# Patient Record
Sex: MALE | Race: WHITE | NOT HISPANIC OR LATINO
[De-identification: names, ages, dates, MRNs, and addresses within clinical notes are randomized per-mention and may not be internally consistent; named-entity substitution may affect disease eponyms.]

---

## 2017-01-17 ENCOUNTER — APPOINTMENT (OUTPATIENT)
Dept: ELECTROPHYSIOLOGY | Facility: CLINIC | Age: 52
End: 2017-01-17

## 2017-02-22 ENCOUNTER — APPOINTMENT (OUTPATIENT)
Dept: ELECTROPHYSIOLOGY | Facility: CLINIC | Age: 52
End: 2017-02-22

## 2017-02-22 ENCOUNTER — NON-APPOINTMENT (OUTPATIENT)
Age: 52
End: 2017-02-22

## 2017-02-22 VITALS
DIASTOLIC BLOOD PRESSURE: 82 MMHG | WEIGHT: 265 LBS | OXYGEN SATURATION: 93 % | SYSTOLIC BLOOD PRESSURE: 118 MMHG | HEIGHT: 75 IN | BODY MASS INDEX: 32.95 KG/M2 | HEART RATE: 59 BPM

## 2017-02-27 ENCOUNTER — MEDICATION RENEWAL (OUTPATIENT)
Age: 52
End: 2017-02-27

## 2017-03-27 ENCOUNTER — APPOINTMENT (OUTPATIENT)
Dept: ELECTROPHYSIOLOGY | Facility: CLINIC | Age: 52
End: 2017-03-27

## 2017-04-04 ENCOUNTER — NON-APPOINTMENT (OUTPATIENT)
Age: 52
End: 2017-04-04

## 2017-04-04 ENCOUNTER — APPOINTMENT (OUTPATIENT)
Dept: CARDIOLOGY | Facility: CLINIC | Age: 52
End: 2017-04-04

## 2017-04-04 VITALS
SYSTOLIC BLOOD PRESSURE: 110 MMHG | HEART RATE: 51 BPM | BODY MASS INDEX: 34.07 KG/M2 | WEIGHT: 274 LBS | HEIGHT: 75 IN | DIASTOLIC BLOOD PRESSURE: 75 MMHG | RESPIRATION RATE: 17 BRPM | OXYGEN SATURATION: 98 %

## 2017-05-01 ENCOUNTER — APPOINTMENT (OUTPATIENT)
Dept: ELECTROPHYSIOLOGY | Facility: CLINIC | Age: 52
End: 2017-05-01

## 2017-05-24 ENCOUNTER — NON-APPOINTMENT (OUTPATIENT)
Age: 52
End: 2017-05-24

## 2017-05-24 ENCOUNTER — RX RENEWAL (OUTPATIENT)
Age: 52
End: 2017-05-24

## 2017-05-24 ENCOUNTER — APPOINTMENT (OUTPATIENT)
Dept: ELECTROPHYSIOLOGY | Facility: CLINIC | Age: 52
End: 2017-05-24

## 2017-05-24 VITALS — SYSTOLIC BLOOD PRESSURE: 109 MMHG | DIASTOLIC BLOOD PRESSURE: 71 MMHG | HEART RATE: 46 BPM

## 2017-06-26 ENCOUNTER — APPOINTMENT (OUTPATIENT)
Dept: ELECTROPHYSIOLOGY | Facility: CLINIC | Age: 52
End: 2017-06-26

## 2017-06-27 ENCOUNTER — APPOINTMENT (OUTPATIENT)
Dept: ELECTROPHYSIOLOGY | Facility: CLINIC | Age: 52
End: 2017-06-27

## 2017-08-08 ENCOUNTER — APPOINTMENT (OUTPATIENT)
Dept: ELECTROPHYSIOLOGY | Facility: CLINIC | Age: 52
End: 2017-08-08
Payer: COMMERCIAL

## 2017-08-08 PROCEDURE — 93298 REM INTERROG DEV EVAL SCRMS: CPT

## 2017-08-25 ENCOUNTER — RX RENEWAL (OUTPATIENT)
Age: 52
End: 2017-08-25

## 2017-09-14 ENCOUNTER — APPOINTMENT (OUTPATIENT)
Dept: ELECTROPHYSIOLOGY | Facility: CLINIC | Age: 52
End: 2017-09-14
Payer: COMMERCIAL

## 2017-09-14 PROCEDURE — 93298 REM INTERROG DEV EVAL SCRMS: CPT

## 2017-10-09 ENCOUNTER — APPOINTMENT (OUTPATIENT)
Dept: CARDIOLOGY | Facility: CLINIC | Age: 52
End: 2017-10-09

## 2017-10-19 ENCOUNTER — APPOINTMENT (OUTPATIENT)
Dept: ELECTROPHYSIOLOGY | Facility: CLINIC | Age: 52
End: 2017-10-19
Payer: COMMERCIAL

## 2017-10-19 PROCEDURE — 93298 REM INTERROG DEV EVAL SCRMS: CPT

## 2017-11-27 ENCOUNTER — RX RENEWAL (OUTPATIENT)
Age: 52
End: 2017-11-27

## 2017-11-30 ENCOUNTER — APPOINTMENT (OUTPATIENT)
Dept: ELECTROPHYSIOLOGY | Facility: CLINIC | Age: 52
End: 2017-11-30
Payer: COMMERCIAL

## 2017-11-30 PROCEDURE — 93298 REM INTERROG DEV EVAL SCRMS: CPT

## 2017-12-19 ENCOUNTER — MEDICATION RENEWAL (OUTPATIENT)
Age: 52
End: 2017-12-19

## 2018-01-04 ENCOUNTER — APPOINTMENT (OUTPATIENT)
Dept: ELECTROPHYSIOLOGY | Facility: CLINIC | Age: 53
End: 2018-01-04
Payer: COMMERCIAL

## 2018-01-04 PROCEDURE — 93298 REM INTERROG DEV EVAL SCRMS: CPT

## 2018-02-22 ENCOUNTER — APPOINTMENT (OUTPATIENT)
Dept: ELECTROPHYSIOLOGY | Facility: CLINIC | Age: 53
End: 2018-02-22
Payer: COMMERCIAL

## 2018-02-22 PROCEDURE — 93298 REM INTERROG DEV EVAL SCRMS: CPT

## 2018-02-26 ENCOUNTER — RX RENEWAL (OUTPATIENT)
Age: 53
End: 2018-02-26

## 2018-03-29 ENCOUNTER — APPOINTMENT (OUTPATIENT)
Dept: ELECTROPHYSIOLOGY | Facility: CLINIC | Age: 53
End: 2018-03-29
Payer: COMMERCIAL

## 2018-03-29 PROCEDURE — 93298 REM INTERROG DEV EVAL SCRMS: CPT

## 2018-05-14 ENCOUNTER — APPOINTMENT (OUTPATIENT)
Dept: ELECTROPHYSIOLOGY | Facility: CLINIC | Age: 53
End: 2018-05-14
Payer: COMMERCIAL

## 2018-05-14 PROCEDURE — 93298 REM INTERROG DEV EVAL SCRMS: CPT

## 2018-06-07 ENCOUNTER — RX RENEWAL (OUTPATIENT)
Age: 53
End: 2018-06-07

## 2018-07-24 ENCOUNTER — APPOINTMENT (OUTPATIENT)
Dept: ELECTROPHYSIOLOGY | Facility: CLINIC | Age: 53
End: 2018-07-24

## 2018-08-22 ENCOUNTER — APPOINTMENT (OUTPATIENT)
Dept: ELECTROPHYSIOLOGY | Facility: CLINIC | Age: 53
End: 2018-08-22
Payer: COMMERCIAL

## 2018-08-22 PROCEDURE — 93298 REM INTERROG DEV EVAL SCRMS: CPT

## 2018-08-22 PROCEDURE — 93299: CPT

## 2018-09-10 ENCOUNTER — RX RENEWAL (OUTPATIENT)
Age: 53
End: 2018-09-10

## 2018-09-17 ENCOUNTER — APPOINTMENT (OUTPATIENT)
Dept: RADIOLOGY | Facility: HOSPITAL | Age: 53
End: 2018-09-17
Payer: COMMERCIAL

## 2018-09-17 ENCOUNTER — OUTPATIENT (OUTPATIENT)
Dept: OUTPATIENT SERVICES | Facility: HOSPITAL | Age: 53
LOS: 1 days | End: 2018-09-17
Payer: COMMERCIAL

## 2018-09-17 DIAGNOSIS — Z00.8 ENCOUNTER FOR OTHER GENERAL EXAMINATION: ICD-10-CM

## 2018-09-17 PROCEDURE — 72100 X-RAY EXAM L-S SPINE 2/3 VWS: CPT | Mod: 26

## 2018-09-17 PROCEDURE — 72100 X-RAY EXAM L-S SPINE 2/3 VWS: CPT

## 2018-09-24 ENCOUNTER — APPOINTMENT (OUTPATIENT)
Dept: ELECTROPHYSIOLOGY | Facility: CLINIC | Age: 53
End: 2018-09-24
Payer: COMMERCIAL

## 2018-09-25 ENCOUNTER — APPOINTMENT (OUTPATIENT)
Dept: ELECTROPHYSIOLOGY | Facility: CLINIC | Age: 53
End: 2018-09-25
Payer: COMMERCIAL

## 2018-09-25 PROCEDURE — 93299: CPT

## 2018-09-25 PROCEDURE — 93298 REM INTERROG DEV EVAL SCRMS: CPT

## 2018-10-24 ENCOUNTER — APPOINTMENT (OUTPATIENT)
Dept: ELECTROPHYSIOLOGY | Facility: CLINIC | Age: 53
End: 2018-10-24
Payer: COMMERCIAL

## 2018-10-24 PROCEDURE — XXXXX: CPT

## 2018-11-26 ENCOUNTER — RX RENEWAL (OUTPATIENT)
Age: 53
End: 2018-11-26

## 2019-03-08 ENCOUNTER — TRANSCRIPTION ENCOUNTER (OUTPATIENT)
Age: 54
End: 2019-03-08

## 2019-03-08 ENCOUNTER — RX RENEWAL (OUTPATIENT)
Age: 54
End: 2019-03-08

## 2019-04-05 ENCOUNTER — APPOINTMENT (OUTPATIENT)
Dept: ELECTROPHYSIOLOGY | Facility: HOSPITAL | Age: 54
End: 2019-04-05
Payer: COMMERCIAL

## 2019-04-05 PROCEDURE — 93299: CPT

## 2019-04-05 PROCEDURE — 93298 REM INTERROG DEV EVAL SCRMS: CPT

## 2019-05-07 ENCOUNTER — APPOINTMENT (OUTPATIENT)
Dept: ELECTROPHYSIOLOGY | Facility: HOSPITAL | Age: 54
End: 2019-05-07
Payer: COMMERCIAL

## 2019-05-07 PROCEDURE — 93299: CPT

## 2019-05-07 PROCEDURE — 93298 REM INTERROG DEV EVAL SCRMS: CPT

## 2019-06-10 ENCOUNTER — APPOINTMENT (OUTPATIENT)
Dept: ELECTROPHYSIOLOGY | Facility: CLINIC | Age: 54
End: 2019-06-10
Payer: COMMERCIAL

## 2019-06-10 PROCEDURE — 93298 REM INTERROG DEV EVAL SCRMS: CPT

## 2019-06-10 PROCEDURE — 93299: CPT

## 2019-06-11 ENCOUNTER — RX RENEWAL (OUTPATIENT)
Age: 54
End: 2019-06-11

## 2019-07-11 ENCOUNTER — APPOINTMENT (OUTPATIENT)
Dept: ELECTROPHYSIOLOGY | Facility: CLINIC | Age: 54
End: 2019-07-11
Payer: COMMERCIAL

## 2019-07-11 PROCEDURE — 93299: CPT

## 2019-07-11 PROCEDURE — 93298 REM INTERROG DEV EVAL SCRMS: CPT

## 2019-08-13 ENCOUNTER — APPOINTMENT (OUTPATIENT)
Dept: ELECTROPHYSIOLOGY | Facility: CLINIC | Age: 54
End: 2019-08-13
Payer: COMMERCIAL

## 2019-08-13 PROCEDURE — 93299: CPT

## 2019-08-13 PROCEDURE — 93298 REM INTERROG DEV EVAL SCRMS: CPT

## 2019-09-13 ENCOUNTER — APPOINTMENT (OUTPATIENT)
Dept: ELECTROPHYSIOLOGY | Facility: CLINIC | Age: 54
End: 2019-09-13
Payer: COMMERCIAL

## 2019-09-13 PROCEDURE — 93298 REM INTERROG DEV EVAL SCRMS: CPT

## 2019-09-13 PROCEDURE — 93299: CPT

## 2019-09-23 ENCOUNTER — MEDICATION RENEWAL (OUTPATIENT)
Age: 54
End: 2019-09-23

## 2019-10-15 ENCOUNTER — APPOINTMENT (OUTPATIENT)
Dept: ELECTROPHYSIOLOGY | Facility: CLINIC | Age: 54
End: 2019-10-15
Payer: COMMERCIAL

## 2019-10-15 PROCEDURE — 93299: CPT

## 2019-10-15 PROCEDURE — 93298 REM INTERROG DEV EVAL SCRMS: CPT

## 2019-11-15 ENCOUNTER — APPOINTMENT (OUTPATIENT)
Dept: ELECTROPHYSIOLOGY | Facility: CLINIC | Age: 54
End: 2019-11-15
Payer: COMMERCIAL

## 2019-11-15 PROCEDURE — 93299: CPT

## 2019-11-15 PROCEDURE — 93298 REM INTERROG DEV EVAL SCRMS: CPT

## 2019-12-20 ENCOUNTER — APPOINTMENT (OUTPATIENT)
Dept: ELECTROPHYSIOLOGY | Facility: CLINIC | Age: 54
End: 2019-12-20
Payer: COMMERCIAL

## 2019-12-20 PROCEDURE — 93299: CPT

## 2019-12-20 PROCEDURE — 93298 REM INTERROG DEV EVAL SCRMS: CPT

## 2020-01-21 ENCOUNTER — APPOINTMENT (OUTPATIENT)
Dept: ELECTROPHYSIOLOGY | Facility: CLINIC | Age: 55
End: 2020-01-21
Payer: COMMERCIAL

## 2020-01-21 PROCEDURE — G2066: CPT

## 2020-01-21 PROCEDURE — 93298 REM INTERROG DEV EVAL SCRMS: CPT

## 2020-02-21 ENCOUNTER — APPOINTMENT (OUTPATIENT)
Dept: ELECTROPHYSIOLOGY | Facility: CLINIC | Age: 55
End: 2020-02-21
Payer: COMMERCIAL

## 2020-02-21 PROCEDURE — G2066: CPT

## 2020-02-21 PROCEDURE — 93298 REM INTERROG DEV EVAL SCRMS: CPT

## 2020-03-25 ENCOUNTER — APPOINTMENT (OUTPATIENT)
Dept: ELECTROPHYSIOLOGY | Facility: CLINIC | Age: 55
End: 2020-03-25
Payer: COMMERCIAL

## 2020-03-25 PROCEDURE — 93298 REM INTERROG DEV EVAL SCRMS: CPT

## 2020-03-25 PROCEDURE — G2066: CPT

## 2020-04-03 ENCOUNTER — RX RENEWAL (OUTPATIENT)
Age: 55
End: 2020-04-03

## 2020-04-27 ENCOUNTER — APPOINTMENT (OUTPATIENT)
Dept: ELECTROPHYSIOLOGY | Facility: CLINIC | Age: 55
End: 2020-04-27
Payer: COMMERCIAL

## 2020-04-27 PROCEDURE — 93298 REM INTERROG DEV EVAL SCRMS: CPT

## 2020-04-27 PROCEDURE — G2066: CPT

## 2020-05-19 ENCOUNTER — OUTPATIENT (OUTPATIENT)
Dept: OUTPATIENT SERVICES | Facility: HOSPITAL | Age: 55
LOS: 1 days | End: 2020-05-19
Payer: COMMERCIAL

## 2020-05-19 ENCOUNTER — APPOINTMENT (OUTPATIENT)
Dept: MRI IMAGING | Facility: HOSPITAL | Age: 55
End: 2020-05-19
Payer: COMMERCIAL

## 2020-05-19 DIAGNOSIS — Z00.8 ENCOUNTER FOR OTHER GENERAL EXAMINATION: ICD-10-CM

## 2020-05-19 PROCEDURE — A9579: CPT

## 2020-05-19 PROCEDURE — 70553 MRI BRAIN STEM W/O & W/DYE: CPT | Mod: 26

## 2020-05-19 PROCEDURE — 70553 MRI BRAIN STEM W/O & W/DYE: CPT

## 2020-05-28 ENCOUNTER — APPOINTMENT (OUTPATIENT)
Dept: ELECTROPHYSIOLOGY | Facility: CLINIC | Age: 55
End: 2020-05-28
Payer: COMMERCIAL

## 2020-05-28 PROCEDURE — G2066: CPT

## 2020-05-28 PROCEDURE — 93298 REM INTERROG DEV EVAL SCRMS: CPT

## 2020-06-30 ENCOUNTER — APPOINTMENT (OUTPATIENT)
Dept: ELECTROPHYSIOLOGY | Facility: CLINIC | Age: 55
End: 2020-06-30

## 2020-07-15 ENCOUNTER — FORM ENCOUNTER (OUTPATIENT)
Age: 55
End: 2020-07-15

## 2020-08-03 ENCOUNTER — APPOINTMENT (OUTPATIENT)
Dept: ELECTROPHYSIOLOGY | Facility: CLINIC | Age: 55
End: 2020-08-03
Payer: COMMERCIAL

## 2020-08-03 PROCEDURE — 93298 REM INTERROG DEV EVAL SCRMS: CPT

## 2020-08-03 PROCEDURE — G2066: CPT

## 2020-09-03 ENCOUNTER — APPOINTMENT (OUTPATIENT)
Dept: ELECTROPHYSIOLOGY | Facility: CLINIC | Age: 55
End: 2020-09-03

## 2020-09-28 ENCOUNTER — APPOINTMENT (OUTPATIENT)
Dept: CARDIOLOGY | Facility: CLINIC | Age: 55
End: 2020-09-28
Payer: COMMERCIAL

## 2020-09-28 ENCOUNTER — RX RENEWAL (OUTPATIENT)
Age: 55
End: 2020-09-28

## 2020-09-28 ENCOUNTER — NON-APPOINTMENT (OUTPATIENT)
Age: 55
End: 2020-09-28

## 2020-09-28 VITALS
HEART RATE: 66 BPM | RESPIRATION RATE: 17 BRPM | WEIGHT: 296 LBS | BODY MASS INDEX: 36.8 KG/M2 | DIASTOLIC BLOOD PRESSURE: 85 MMHG | TEMPERATURE: 98.4 F | HEIGHT: 75 IN | OXYGEN SATURATION: 96 % | SYSTOLIC BLOOD PRESSURE: 122 MMHG

## 2020-09-28 PROCEDURE — 93306 TTE W/DOPPLER COMPLETE: CPT

## 2020-09-28 PROCEDURE — 93000 ELECTROCARDIOGRAM COMPLETE: CPT

## 2020-09-28 PROCEDURE — 99215 OFFICE O/P EST HI 40 MIN: CPT

## 2020-09-30 ENCOUNTER — TRANSCRIPTION ENCOUNTER (OUTPATIENT)
Age: 55
End: 2020-09-30

## 2020-10-02 ENCOUNTER — APPOINTMENT (OUTPATIENT)
Dept: ELECTROPHYSIOLOGY | Facility: CLINIC | Age: 55
End: 2020-10-02
Payer: COMMERCIAL

## 2020-10-02 PROCEDURE — 99203 OFFICE O/P NEW LOW 30 MIN: CPT | Mod: 95

## 2020-10-02 NOTE — DISCUSSION/SUMMARY
[FreeTextEntry1] : s/p ILR for suspected stroke/TIA.  No events through > 3 years of monitoring.  We discussed the option of explant versus abandoning the device.  All of his questions were answered and the procedure will be arranged.

## 2020-10-02 NOTE — HISTORY OF PRESENT ILLNESS
[FreeTextEntry1] : 30  minute Telehealth visit in the setting of COVID 19:\par \par In 2016 he underwent ILR implantation to screen for occult AF as potential etiology for cryptogenic stroke.  He clarifies that at that time he stood up from his chair at work and syncopized without warning.  There was some concern for "stroke" but eventually this diagnosis was refuted.  His ILR is approaching end of life and he presents today to discuss explant.  \par \par Overall he has been feeling well with no cardiac complaints.  He denies chest pain. No shortness of breath.  No lightheadedness/dizziness.  No lightheadedness/dizziness. He has had no further syncope.

## 2020-10-12 ENCOUNTER — FORM ENCOUNTER (OUTPATIENT)
Age: 55
End: 2020-10-12

## 2021-03-11 ENCOUNTER — RX RENEWAL (OUTPATIENT)
Age: 56
End: 2021-03-11

## 2021-04-09 ENCOUNTER — NON-APPOINTMENT (OUTPATIENT)
Age: 56
End: 2021-04-09

## 2021-04-09 ENCOUNTER — APPOINTMENT (OUTPATIENT)
Dept: CARDIOLOGY | Facility: CLINIC | Age: 56
End: 2021-04-09
Payer: COMMERCIAL

## 2021-04-09 VITALS
TEMPERATURE: 98.2 F | DIASTOLIC BLOOD PRESSURE: 89 MMHG | BODY MASS INDEX: 37.05 KG/M2 | WEIGHT: 298 LBS | HEIGHT: 75 IN | HEART RATE: 70 BPM | SYSTOLIC BLOOD PRESSURE: 134 MMHG | OXYGEN SATURATION: 95 % | RESPIRATION RATE: 20 BRPM

## 2021-04-09 PROCEDURE — 99213 OFFICE O/P EST LOW 20 MIN: CPT

## 2021-04-09 PROCEDURE — 99072 ADDL SUPL MATRL&STAF TM PHE: CPT

## 2021-04-09 PROCEDURE — 93000 ELECTROCARDIOGRAM COMPLETE: CPT

## 2021-07-04 ENCOUNTER — RX RENEWAL (OUTPATIENT)
Age: 56
End: 2021-07-04

## 2021-10-08 ENCOUNTER — NON-APPOINTMENT (OUTPATIENT)
Age: 56
End: 2021-10-08

## 2021-10-08 ENCOUNTER — APPOINTMENT (OUTPATIENT)
Dept: CARDIOLOGY | Facility: CLINIC | Age: 56
End: 2021-10-08
Payer: COMMERCIAL

## 2021-10-08 VITALS
OXYGEN SATURATION: 95 % | WEIGHT: 294 LBS | TEMPERATURE: 98.3 F | HEART RATE: 57 BPM | DIASTOLIC BLOOD PRESSURE: 72 MMHG | SYSTOLIC BLOOD PRESSURE: 109 MMHG | BODY MASS INDEX: 39.82 KG/M2 | HEIGHT: 72 IN | RESPIRATION RATE: 20 BRPM

## 2021-10-08 DIAGNOSIS — Z95.818 PRESENCE OF OTHER CARDIAC IMPLANTS AND GRAFTS: ICD-10-CM

## 2021-10-08 PROCEDURE — 93000 ELECTROCARDIOGRAM COMPLETE: CPT

## 2021-10-08 PROCEDURE — 99214 OFFICE O/P EST MOD 30 MIN: CPT

## 2021-11-05 ENCOUNTER — APPOINTMENT (OUTPATIENT)
Dept: INTERNAL MEDICINE | Facility: CLINIC | Age: 56
End: 2021-11-05
Payer: COMMERCIAL

## 2021-11-05 VITALS
HEIGHT: 72 IN | TEMPERATURE: 97.3 F | BODY MASS INDEX: 39.55 KG/M2 | WEIGHT: 292 LBS | DIASTOLIC BLOOD PRESSURE: 70 MMHG | HEART RATE: 80 BPM | OXYGEN SATURATION: 97 % | SYSTOLIC BLOOD PRESSURE: 110 MMHG

## 2021-11-05 DIAGNOSIS — Z82.0 FAMILY HISTORY OF EPILEPSY AND OTHER DISEASES OF THE NERVOUS SYSTEM: ICD-10-CM

## 2021-11-05 DIAGNOSIS — J30.2 OTHER SEASONAL ALLERGIC RHINITIS: ICD-10-CM

## 2021-11-05 PROCEDURE — 99205 OFFICE O/P NEW HI 60 MIN: CPT | Mod: 25

## 2021-11-05 PROCEDURE — 90686 IIV4 VACC NO PRSV 0.5 ML IM: CPT

## 2021-11-05 PROCEDURE — 90662 IIV NO PRSV INCREASED AG IM: CPT

## 2021-11-05 PROCEDURE — G0008: CPT

## 2021-11-05 NOTE — PLAN
[FreeTextEntry1] : Continue Peloton 5x/wk.\par Calorie restriction.\par Influenza vaccine given.\par Weight loss via calorie restriction.

## 2021-12-31 ENCOUNTER — TRANSCRIPTION ENCOUNTER (OUTPATIENT)
Age: 56
End: 2021-12-31

## 2022-04-08 ENCOUNTER — APPOINTMENT (OUTPATIENT)
Dept: CARDIOLOGY | Facility: CLINIC | Age: 57
End: 2022-04-08
Payer: COMMERCIAL

## 2022-04-08 ENCOUNTER — NON-APPOINTMENT (OUTPATIENT)
Age: 57
End: 2022-04-08

## 2022-04-08 VITALS
OXYGEN SATURATION: 96 % | RESPIRATION RATE: 16 BRPM | DIASTOLIC BLOOD PRESSURE: 75 MMHG | SYSTOLIC BLOOD PRESSURE: 110 MMHG | HEIGHT: 72 IN | BODY MASS INDEX: 38.6 KG/M2 | HEART RATE: 45 BPM | TEMPERATURE: 97.6 F | WEIGHT: 285 LBS

## 2022-04-08 DIAGNOSIS — G45.9 TRANSIENT CEREBRAL ISCHEMIC ATTACK, UNSPECIFIED: ICD-10-CM

## 2022-04-08 PROCEDURE — 99215 OFFICE O/P EST HI 40 MIN: CPT

## 2022-04-08 PROCEDURE — 93000 ELECTROCARDIOGRAM COMPLETE: CPT

## 2022-04-18 ENCOUNTER — NON-APPOINTMENT (OUTPATIENT)
Age: 57
End: 2022-04-18

## 2022-04-22 ENCOUNTER — APPOINTMENT (OUTPATIENT)
Dept: CARDIOLOGY | Facility: CLINIC | Age: 57
End: 2022-04-22
Payer: COMMERCIAL

## 2022-04-22 PROCEDURE — 93015 CV STRESS TEST SUPVJ I&R: CPT

## 2022-10-03 ENCOUNTER — APPOINTMENT (OUTPATIENT)
Dept: CARDIOLOGY | Facility: CLINIC | Age: 57
End: 2022-10-03

## 2022-10-03 ENCOUNTER — NON-APPOINTMENT (OUTPATIENT)
Age: 57
End: 2022-10-03

## 2022-10-03 VITALS
SYSTOLIC BLOOD PRESSURE: 121 MMHG | TEMPERATURE: 98.2 F | HEART RATE: 62 BPM | WEIGHT: 288 LBS | BODY MASS INDEX: 39.01 KG/M2 | OXYGEN SATURATION: 93 % | HEIGHT: 72 IN | DIASTOLIC BLOOD PRESSURE: 73 MMHG | RESPIRATION RATE: 20 BRPM

## 2022-10-03 DIAGNOSIS — R06.00 DYSPNEA, UNSPECIFIED: ICD-10-CM

## 2022-10-03 DIAGNOSIS — I25.2 OLD MYOCARDIAL INFARCTION: ICD-10-CM

## 2022-10-03 DIAGNOSIS — R42 DIZZINESS AND GIDDINESS: ICD-10-CM

## 2022-10-03 PROCEDURE — 99215 OFFICE O/P EST HI 40 MIN: CPT | Mod: 25

## 2022-10-03 PROCEDURE — 93270 REMOTE 30 DAY ECG REV/REPORT: CPT

## 2022-10-03 PROCEDURE — 93000 ELECTROCARDIOGRAM COMPLETE: CPT | Mod: 59

## 2022-10-11 ENCOUNTER — APPOINTMENT (OUTPATIENT)
Dept: CARDIOLOGY | Facility: CLINIC | Age: 57
End: 2022-10-11

## 2022-10-11 ENCOUNTER — OUTPATIENT (OUTPATIENT)
Dept: OUTPATIENT SERVICES | Facility: HOSPITAL | Age: 57
LOS: 1 days | End: 2022-10-11
Payer: COMMERCIAL

## 2022-10-11 ENCOUNTER — APPOINTMENT (OUTPATIENT)
Dept: CT IMAGING | Facility: CLINIC | Age: 57
End: 2022-10-11

## 2022-10-11 DIAGNOSIS — Z00.8 ENCOUNTER FOR OTHER GENERAL EXAMINATION: ICD-10-CM

## 2022-10-11 PROCEDURE — 75574 CT ANGIO HRT W/3D IMAGE: CPT | Mod: 26

## 2022-10-11 PROCEDURE — 75574 CT ANGIO HRT W/3D IMAGE: CPT

## 2022-10-11 PROCEDURE — 93306 TTE W/DOPPLER COMPLETE: CPT

## 2022-10-16 ENCOUNTER — NON-APPOINTMENT (OUTPATIENT)
Age: 57
End: 2022-10-16

## 2022-10-17 ENCOUNTER — NON-APPOINTMENT (OUTPATIENT)
Age: 57
End: 2022-10-17

## 2022-10-17 LAB
ALBUMIN SERPL ELPH-MCNC: 4.2 G/DL
ALP BLD-CCNC: 107 U/L
ALT SERPL-CCNC: 30 U/L
ANION GAP SERPL CALC-SCNC: 12 MMOL/L
AST SERPL-CCNC: 25 U/L
BASOPHILS # BLD AUTO: 0.08 K/UL
BASOPHILS NFR BLD AUTO: 0.6 %
BILIRUB SERPL-MCNC: 0.6 MG/DL
BUN SERPL-MCNC: 16 MG/DL
CALCIUM SERPL-MCNC: 9.6 MG/DL
CHLORIDE SERPL-SCNC: 104 MMOL/L
CHOLEST SERPL-MCNC: 168 MG/DL
CO2 SERPL-SCNC: 24 MMOL/L
CREAT SERPL-MCNC: 1.12 MG/DL
EGFR: 77 ML/MIN/1.73M2
EOSINOPHIL # BLD AUTO: 0.23 K/UL
EOSINOPHIL NFR BLD AUTO: 1.8 %
ESTIMATED AVERAGE GLUCOSE: 105 MG/DL
GLUCOSE SERPL-MCNC: 102 MG/DL
HBA1C MFR BLD HPLC: 5.3 %
HCT VFR BLD CALC: 45.6 %
HDLC SERPL-MCNC: 46 MG/DL
HGB BLD-MCNC: 15.5 G/DL
IMM GRANULOCYTES NFR BLD AUTO: 0.2 %
LDLC SERPL CALC-MCNC: 107 MG/DL
LYMPHOCYTES # BLD AUTO: 2.23 K/UL
LYMPHOCYTES NFR BLD AUTO: 17.8 %
MAN DIFF?: NORMAL
MCHC RBC-ENTMCNC: 32.2 PG
MCHC RBC-ENTMCNC: 34 GM/DL
MCV RBC AUTO: 94.6 FL
MONOCYTES # BLD AUTO: 1.14 K/UL
MONOCYTES NFR BLD AUTO: 9.1 %
NEUTROPHILS # BLD AUTO: 8.85 K/UL
NEUTROPHILS NFR BLD AUTO: 70.5 %
NONHDLC SERPL-MCNC: 122 MG/DL
PLATELET # BLD AUTO: 226 K/UL
POTASSIUM SERPL-SCNC: 4.7 MMOL/L
PROT SERPL-MCNC: 6.9 G/DL
RBC # BLD: 4.82 M/UL
RBC # FLD: 12.5 %
SODIUM SERPL-SCNC: 141 MMOL/L
TRIGL SERPL-MCNC: 76 MG/DL
TSH SERPL-ACNC: 3.03 UIU/ML
WBC # FLD AUTO: 12.56 K/UL

## 2022-10-20 ENCOUNTER — APPOINTMENT (OUTPATIENT)
Dept: CT IMAGING | Facility: CLINIC | Age: 57
End: 2022-10-20

## 2022-10-22 ENCOUNTER — EMERGENCY (EMERGENCY)
Facility: HOSPITAL | Age: 57
LOS: 1 days | Discharge: HOME | End: 2022-10-22
Attending: EMERGENCY MEDICINE | Admitting: EMERGENCY MEDICINE
Payer: COMMERCIAL

## 2022-10-22 VITALS
SYSTOLIC BLOOD PRESSURE: 145 MMHG | TEMPERATURE: 98 F | RESPIRATION RATE: 18 BRPM | OXYGEN SATURATION: 96 % | DIASTOLIC BLOOD PRESSURE: 89 MMHG | HEIGHT: 75 IN | HEART RATE: 68 BPM

## 2022-10-22 LAB
ALBUMIN SERPL ELPH-MCNC: 3.1 G/DL — LOW (ref 3.3–5)
ALP SERPL-CCNC: 100 U/L — SIGNIFICANT CHANGE UP (ref 40–120)
ALT FLD-CCNC: 37 U/L — SIGNIFICANT CHANGE UP (ref 10–45)
ANION GAP SERPL CALC-SCNC: 5 MMOL/L — SIGNIFICANT CHANGE UP (ref 5–17)
APTT BLD: 31.1 SEC — SIGNIFICANT CHANGE UP (ref 27.5–35.5)
AST SERPL-CCNC: 17 U/L — SIGNIFICANT CHANGE UP (ref 10–40)
BASOPHILS # BLD AUTO: 0.09 K/UL — SIGNIFICANT CHANGE UP (ref 0–0.2)
BASOPHILS NFR BLD AUTO: 0.8 % — SIGNIFICANT CHANGE UP (ref 0–2)
BILIRUB SERPL-MCNC: 0.4 MG/DL — SIGNIFICANT CHANGE UP (ref 0.2–1.2)
BUN SERPL-MCNC: 20 MG/DL — SIGNIFICANT CHANGE UP (ref 7–23)
CALCIUM SERPL-MCNC: 9.4 MG/DL — SIGNIFICANT CHANGE UP (ref 8.4–10.5)
CHLORIDE SERPL-SCNC: 105 MMOL/L — SIGNIFICANT CHANGE UP (ref 96–108)
CO2 SERPL-SCNC: 29 MMOL/L — SIGNIFICANT CHANGE UP (ref 22–31)
CREAT SERPL-MCNC: 1.12 MG/DL — SIGNIFICANT CHANGE UP (ref 0.5–1.3)
EGFR: 77 ML/MIN/1.73M2 — SIGNIFICANT CHANGE UP
EOSINOPHIL # BLD AUTO: 0.27 K/UL — SIGNIFICANT CHANGE UP (ref 0–0.5)
EOSINOPHIL NFR BLD AUTO: 2.4 % — SIGNIFICANT CHANGE UP (ref 0–6)
GLUCOSE SERPL-MCNC: 94 MG/DL — SIGNIFICANT CHANGE UP (ref 70–99)
HCT VFR BLD CALC: 40.7 % — SIGNIFICANT CHANGE UP (ref 39–50)
HGB BLD-MCNC: 14.3 G/DL — SIGNIFICANT CHANGE UP (ref 13–17)
IMM GRANULOCYTES NFR BLD AUTO: 1 % — HIGH (ref 0–0.9)
INR BLD: 1.19 RATIO — HIGH (ref 0.88–1.16)
LYMPHOCYTES # BLD AUTO: 2.79 K/UL — SIGNIFICANT CHANGE UP (ref 1–3.3)
LYMPHOCYTES # BLD AUTO: 24.4 % — SIGNIFICANT CHANGE UP (ref 13–44)
MCHC RBC-ENTMCNC: 32.4 PG — SIGNIFICANT CHANGE UP (ref 27–34)
MCHC RBC-ENTMCNC: 35.1 GM/DL — SIGNIFICANT CHANGE UP (ref 32–36)
MCV RBC AUTO: 92.3 FL — SIGNIFICANT CHANGE UP (ref 80–100)
MONOCYTES # BLD AUTO: 1.15 K/UL — HIGH (ref 0–0.9)
MONOCYTES NFR BLD AUTO: 10.1 % — SIGNIFICANT CHANGE UP (ref 2–14)
NEUTROPHILS # BLD AUTO: 7.03 K/UL — SIGNIFICANT CHANGE UP (ref 1.8–7.4)
NEUTROPHILS NFR BLD AUTO: 61.3 % — SIGNIFICANT CHANGE UP (ref 43–77)
NRBC # BLD: 0 /100 WBCS — SIGNIFICANT CHANGE UP (ref 0–0)
PLATELET # BLD AUTO: 257 K/UL — SIGNIFICANT CHANGE UP (ref 150–400)
POTASSIUM SERPL-MCNC: 4.4 MMOL/L — SIGNIFICANT CHANGE UP (ref 3.5–5.3)
POTASSIUM SERPL-SCNC: 4.4 MMOL/L — SIGNIFICANT CHANGE UP (ref 3.5–5.3)
PROT SERPL-MCNC: 7 G/DL — SIGNIFICANT CHANGE UP (ref 6–8.3)
PROTHROM AB SERPL-ACNC: 13.8 SEC — HIGH (ref 10.5–13.4)
RBC # BLD: 4.41 M/UL — SIGNIFICANT CHANGE UP (ref 4.2–5.8)
RBC # FLD: 12.1 % — SIGNIFICANT CHANGE UP (ref 10.3–14.5)
SODIUM SERPL-SCNC: 139 MMOL/L — SIGNIFICANT CHANGE UP (ref 135–145)
WBC # BLD: 11.44 K/UL — HIGH (ref 3.8–10.5)
WBC # FLD AUTO: 11.44 K/UL — HIGH (ref 3.8–10.5)

## 2022-10-22 PROCEDURE — 36415 COLL VENOUS BLD VENIPUNCTURE: CPT

## 2022-10-22 PROCEDURE — 85025 COMPLETE CBC W/AUTO DIFF WBC: CPT

## 2022-10-22 PROCEDURE — 90715 TDAP VACCINE 7 YRS/> IM: CPT

## 2022-10-22 PROCEDURE — 90471 IMMUNIZATION ADMIN: CPT

## 2022-10-22 PROCEDURE — 99284 EMERGENCY DEPT VISIT MOD MDM: CPT

## 2022-10-22 PROCEDURE — 73706 CT ANGIO LWR EXTR W/O&W/DYE: CPT | Mod: 26,RT,MA

## 2022-10-22 PROCEDURE — 85730 THROMBOPLASTIN TIME PARTIAL: CPT

## 2022-10-22 PROCEDURE — 73590 X-RAY EXAM OF LOWER LEG: CPT

## 2022-10-22 PROCEDURE — 99284 EMERGENCY DEPT VISIT MOD MDM: CPT | Mod: 25

## 2022-10-22 PROCEDURE — 73590 X-RAY EXAM OF LOWER LEG: CPT | Mod: 26,RT

## 2022-10-22 PROCEDURE — 80053 COMPREHEN METABOLIC PANEL: CPT

## 2022-10-22 PROCEDURE — 73706 CT ANGIO LWR EXTR W/O&W/DYE: CPT | Mod: MA

## 2022-10-22 PROCEDURE — 85610 PROTHROMBIN TIME: CPT

## 2022-10-22 RX ORDER — TETANUS TOXOID, REDUCED DIPHTHERIA TOXOID AND ACELLULAR PERTUSSIS VACCINE, ADSORBED 5; 2.5; 8; 8; 2.5 [IU]/.5ML; [IU]/.5ML; UG/.5ML; UG/.5ML; UG/.5ML
0.5 SUSPENSION INTRAMUSCULAR ONCE
Refills: 0 | Status: COMPLETED | OUTPATIENT
Start: 2022-10-22 | End: 2022-10-22

## 2022-10-22 RX ADMIN — TETANUS TOXOID, REDUCED DIPHTHERIA TOXOID AND ACELLULAR PERTUSSIS VACCINE, ADSORBED 0.5 MILLILITER(S): 5; 2.5; 8; 8; 2.5 SUSPENSION INTRAMUSCULAR at 11:41

## 2022-10-22 NOTE — ED PROVIDER NOTE - NS ED ATTENDING STATEMENT MOD
This was a shared visit with the ANNA MARIE. I reviewed and verified the documentation and independently performed the documented:

## 2022-10-22 NOTE — ED PROVIDER NOTE - NSFOLLOWUPINSTRUCTIONS_ED_ALL_ED_FT
Laceration    A laceration is a cut that goes through all of the layers of the skin and into the tissue that is right under the skin. Some lacerations heal on their own. Others need to be closed with skin adhesive strips, skin glue, stitches (sutures), or staples. Proper laceration care minimizes the risk of infection and helps the laceration to heal better.  If non-absorbable stitches or staples have been placed, they must be taken out within the time frame instructed by your healthcare provider.    SEEK IMMEDIATE MEDICAL CARE IF YOU HAVE ANY OF THE FOLLOWING SYMPTOMS: swelling around the wound, worsening pain, drainage from the wound, red streaking going away from your wound, inability to move finger or toe near the laceration, or discoloration of skin near the laceration.     Follow up with your primary care provider within 48-72 hours for wound check. Keep elevated with compression. Keep sutures covered and dry for 24 hours then clean with soap and water daily.  Apply bacitracin and cover.  Return to ED for suture removal 10 days. Any increased pain, redness, streaking (red lines), swelling, fever, chills return to ER.

## 2022-10-22 NOTE — ED PROVIDER NOTE - PATIENT PORTAL LINK FT
You can access the FollowMyHealth Patient Portal offered by [facility] by registering at the following website: [email or web address]. By joining [employer]’s FollowMyHealth portal, you will also be able to view your health information using other applications (apps) compatible with our system.

## 2022-10-22 NOTE — ED ADULT NURSE NOTE - OBJECTIVE STATEMENT
Pt presents to the ED with c/o falling on his boat today hitting his right lower leg against the ladder. Laceration noted to right lower leg. Denies any other injury.

## 2022-10-22 NOTE — ED PROVIDER NOTE - NSICDXPASTMEDICALHX_GEN_ALL_CORE_FT
PAST MEDICAL HISTORY:  HLD (hyperlipidemia)     MI (myocardial infarction)     PFO (patent foramen ovale)     TIA (transient ischemic attack)

## 2022-10-22 NOTE — ED PROVIDER NOTE - OBJECTIVE STATEMENT
pt 57 yo m c/o laceration to right anterior leg. pt was on his boat and he hit his leg on the metal of the laddar. unknown last tdap. denies pain. bleeding controlled. +swelling. denies numbness, tingling, weakness

## 2022-10-22 NOTE — ED PROVIDER NOTE - CLINICAL SUMMARY MEDICAL DECISION MAKING FREE TEXT BOX
pt 55 yo m c/o laceration to right anterior leg. pt was on his boat and he hit his leg on the metal of the ladder. unknown last tdap. denies pain. bleeding controlled. +swelling. denies numbness, tingling, weakness  xray, tdap, abx, lac repair

## 2022-10-22 NOTE — ED PROVIDER NOTE - ATTENDING APP SHARED VISIT CONTRIBUTION OF CARE
Dr. Marcial: I performed a face to face bedside interview with patient regarding history of present illness, review of symptoms and past medical history. I completed an independent physical exam.  I have discussed patient's plan of care with PA.   I agree with note as stated above, having amended the EMR as needed to reflect my findings.   This includes HISTORY OF PRESENT ILLNESS, HIV, PAST MEDICAL/SURGICAL/FAMILY/SOCIAL HISTORY, ALLERGIES AND HOME MEDICATIONS, REVIEW OF SYSTEMS, PHYSICAL EXAM, and any PROGRESS NOTES during the time I functioned as the attending physician for this patient.  ADOLPH Marcial DO

## 2022-10-22 NOTE — ED ADULT NURSE NOTE - IN THE PAST 12 MONTHS HAVE YOU USED DRUGS OTHER THAN THOSE REQUIRED FOR MEDICAL REASON?
Patient to complete triage, please inform patient Covid testing order was placed
Pt informed of covid test order. Pt given number to schedule test.  Overall fine and feels congested. Denies fever or loss of sense of taste or smell. Pt reports she does have trouble taking deep breaths in. Denies shortness of breath.   Informed she krishna
Triage staff at site to call pt and triage pts' symptoms. From: Carleen Felton  To: Vickey Solano.  Paresh Gallego MD  Sent: 8/23/2021  6:30 PM CDT  Subject: Non-Urgent Medical Question    Good Evening,    I have had chest and nasal congestion for a adalgisa
No

## 2022-10-22 NOTE — ED PROVIDER NOTE - PHYSICAL EXAMINATION
Gen: Well appearing in NAD  Head: NC/AT  Neck: trachea midline  Resp:  No distress  Ext: right le: swelling, soft compartments, sensation intact, cap refill <3, 2+ pedal pulse, FROM., strenght 5/5  Neuro:  A&O appears non focal  Skin:  1cm laceration/puncture wound anterior proximal shin   Psych:  Normal affect and mood

## 2022-10-26 PROCEDURE — 93248 EXT ECG>7D<15D REV&INTERPJ: CPT

## 2022-10-31 ENCOUNTER — NON-APPOINTMENT (OUTPATIENT)
Age: 57
End: 2022-10-31

## 2022-11-01 ENCOUNTER — TRANSCRIPTION ENCOUNTER (OUTPATIENT)
Age: 57
End: 2022-11-01

## 2022-11-21 ENCOUNTER — APPOINTMENT (OUTPATIENT)
Dept: INTERNAL MEDICINE | Facility: CLINIC | Age: 57
End: 2022-11-21

## 2022-11-21 VITALS
DIASTOLIC BLOOD PRESSURE: 70 MMHG | HEIGHT: 74 IN | RESPIRATION RATE: 12 BRPM | SYSTOLIC BLOOD PRESSURE: 110 MMHG | TEMPERATURE: 98.1 F | OXYGEN SATURATION: 97 % | BODY MASS INDEX: 36.32 KG/M2 | WEIGHT: 283 LBS | HEART RATE: 57 BPM

## 2022-11-21 DIAGNOSIS — Z23 ENCOUNTER FOR IMMUNIZATION: ICD-10-CM

## 2022-11-21 PROCEDURE — G0008: CPT

## 2022-11-21 PROCEDURE — 99396 PREV VISIT EST AGE 40-64: CPT | Mod: 25

## 2022-11-21 PROCEDURE — 90686 IIV4 VACC NO PRSV 0.5 ML IM: CPT

## 2022-11-21 RX ORDER — IPRATROPIUM BROMIDE 42 UG/1
0.06 SPRAY NASAL
Qty: 15 | Refills: 0 | Status: DISCONTINUED | COMMUNITY
Start: 2022-10-17

## 2022-11-21 RX ORDER — DOXYCYCLINE 100 MG/1
100 CAPSULE ORAL
Qty: 20 | Refills: 0 | Status: DISCONTINUED | COMMUNITY
Start: 2022-10-17

## 2022-11-21 RX ORDER — SULFAMETHOXAZOLE AND TRIMETHOPRIM 800; 160 MG/1; MG/1
800-160 TABLET ORAL
Qty: 14 | Refills: 0 | Status: DISCONTINUED | COMMUNITY
Start: 2022-10-22

## 2022-11-21 NOTE — PLAN
[FreeTextEntry1] : Increase Livalo to 4mg.\par Check lipid profile and cmp in 6 months.\par Check Psa.

## 2022-11-21 NOTE — HEALTH RISK ASSESSMENT
[Very Good] : ~his/her~ current health as very good [Good] : ~his/her~  mood as  good [Intercurrent Urgi Care visits] : went to urgent care [Yes] : Yes [1 or 2 (0 pts)] : 1 or 2 (0 points) [Never (0 pts)] : Never (0 points) [No] : In the past 12 months have you used drugs other than those required for medical reasons? No [No falls in past year] : Patient reported no falls in the past year [0] : 2) Feeling down, depressed, or hopeless: Not at all (0) [HIV test declined] : HIV test declined [Hepatitis C test declined] : Hepatitis C test declined [None] : None [Employed] : employed [College] : College [] :  [Sexually Active] : sexually active [Feels Safe at Home] : Feels safe at home [Fully functional (bathing, dressing, toileting, transferring, walking, feeding)] : Fully functional (bathing, dressing, toileting, transferring, walking, feeding) [Fully functional (using the telephone, shopping, preparing meals, housekeeping, doing laundry, using] : Fully functional and needs no help or supervision to perform IADLs (using the telephone, shopping, preparing meals, housekeeping, doing laundry, using transportation, managing medications and managing finances) [Smoke Detector] : smoke detector [Carbon Monoxide Detector] : carbon monoxide detector [Seat Belt] :  uses seat belt [Sunscreen] : uses sunscreen [With Patient/Caregiver] : , with patient/caregiver [2 - 4 times a month (2 pts)] : 2-4 times a month (2 points) [PHQ-2 Negative - No further assessment needed] : PHQ-2 Negative - No further assessment needed [Name: ___] : Health Care Proxy's Name: [unfilled]  [Relationship: ___] : Relationship: [unfilled] [FreeTextEntry1] : hyperlipidemia,  [de-identified] : uri? [Audit-CScore] : 2 [de-identified] : spins 3-4 times a week 30 min  [de-identified] : chicken,beef,veggies,rice,salad  [[id number]] : 0 [Change in mental status noted] : No change in mental status noted [Language] : denies difficulty with language [Behavior] : denies difficulty with behavior [Learning/Retaining New Information] : denies difficulty learning/retaining new information [Handling Complex Tasks] : denies difficulty handling complex tasks [Reasoning] : denies difficulty with reasoning [Spatial Ability and Orientation] : denies difficulty with spatial ability and orientation [High Risk Behavior] : no high risk behavior [Reports changes in hearing] : Reports no changes in hearing [Reports changes in vision] : Reports no changes in vision [Reports normal functional visual acuity (ie: able to read med bottle)] : Reports poor functional visual acuity.  [Reports changes in dental health] : Reports no changes in dental health [Guns at Home] : no guns at home [Safety elements used in home] : no safety elements used in home [Travel to Developing Areas] : does not  travel to developing areas [TB Exposure] : is not being exposed to tuberculosis [Caregiver Concerns] : does not have caregiver concerns [ColonoscopyComments] : never had one, [FreeTextEntry2] :   [de-identified] : will schedule apt  [AdvancecareDate] : 11/21/22

## 2022-11-28 LAB
ALBUMIN SERPL ELPH-MCNC: 4.2 G/DL
ALP BLD-CCNC: 85 U/L
ALT SERPL-CCNC: 21 U/L
ANION GAP SERPL CALC-SCNC: 15 MMOL/L
AST SERPL-CCNC: 23 U/L
BILIRUB SERPL-MCNC: 0.7 MG/DL
BUN SERPL-MCNC: 12 MG/DL
CALCIUM SERPL-MCNC: 9.5 MG/DL
CHLORIDE SERPL-SCNC: 105 MMOL/L
CO2 SERPL-SCNC: 20 MMOL/L
CREAT SERPL-MCNC: 1.02 MG/DL
EGFR: 86 ML/MIN/1.73M2
GLUCOSE SERPL-MCNC: 88 MG/DL
POTASSIUM SERPL-SCNC: 4.2 MMOL/L
PROT SERPL-MCNC: 6.9 G/DL
PSA FREE FLD-MCNC: 47 %
PSA FREE SERPL-MCNC: 0.38 NG/ML
PSA SERPL-MCNC: 0.82 NG/ML
SODIUM SERPL-SCNC: 140 MMOL/L

## 2022-12-13 RX ORDER — ATENOLOL 50 MG/1
50 TABLET ORAL
Qty: 90 | Refills: 3 | Status: DISCONTINUED | COMMUNITY
Start: 2021-04-09 | End: 2022-12-13

## 2023-01-26 ENCOUNTER — OUTPATIENT (OUTPATIENT)
Dept: OUTPATIENT SERVICES | Facility: HOSPITAL | Age: 58
LOS: 1 days | End: 2023-01-26
Payer: COMMERCIAL

## 2023-01-26 ENCOUNTER — TRANSCRIPTION ENCOUNTER (OUTPATIENT)
Age: 58
End: 2023-01-26

## 2023-01-26 VITALS
OXYGEN SATURATION: 95 % | HEIGHT: 74 IN | WEIGHT: 279.99 LBS | TEMPERATURE: 98 F | RESPIRATION RATE: 18 BRPM | DIASTOLIC BLOOD PRESSURE: 84 MMHG | HEART RATE: 78 BPM | SYSTOLIC BLOOD PRESSURE: 123 MMHG

## 2023-01-26 VITALS
OXYGEN SATURATION: 95 % | DIASTOLIC BLOOD PRESSURE: 78 MMHG | RESPIRATION RATE: 18 BRPM | HEART RATE: 68 BPM | SYSTOLIC BLOOD PRESSURE: 120 MMHG

## 2023-01-26 DIAGNOSIS — Z45.010 ENCOUNTER FOR CHECKING AND TESTING OF CARDIAC PACEMAKER PULSE GENERATOR [BATTERY]: ICD-10-CM

## 2023-01-26 PROCEDURE — 33286 RMVL SUBQ CAR RHYTHM MNTR: CPT

## 2023-01-26 RX ORDER — CHLORHEXIDINE GLUCONATE 213 G/1000ML
1 SOLUTION TOPICAL ONCE
Refills: 0 | Status: COMPLETED | OUTPATIENT
Start: 2023-01-26 | End: 2023-01-26

## 2023-01-26 RX ORDER — ASPIRIN/CALCIUM CARB/MAGNESIUM 324 MG
1 TABLET ORAL
Qty: 0 | Refills: 0 | DISCHARGE

## 2023-01-26 RX ORDER — PITAVASTATIN CALCIUM 1.04 MG/1
2 TABLET, FILM COATED ORAL
Qty: 0 | Refills: 0 | DISCHARGE

## 2023-01-26 RX ORDER — PITAVASTATIN CALCIUM 1.04 MG/1
1 TABLET, FILM COATED ORAL
Qty: 0 | Refills: 0 | DISCHARGE

## 2023-01-26 RX ADMIN — CHLORHEXIDINE GLUCONATE 1 APPLICATION(S): 213 SOLUTION TOPICAL at 11:41

## 2023-01-26 NOTE — ASU PATIENT PROFILE, ADULT - FALL HARM RISK - PT AGE POPULATION HIDDEN
Anesthesia Post Evaluation    Patient: Laron Kothari Jr.    Procedure(s) Performed: Procedure(s) (LRB):  ARTHROSCOPY, KNEE, WITH MENISCECTOMY (Right)    Final Anesthesia Type: general    Patient location during evaluation: PACU  Patient participation: Yes- Able to Participate  Level of consciousness: awake and alert  Post-procedure vital signs: reviewed and stable  Pain management: adequate  Airway patency: patent  MARIYA mitigation strategies: Extubation while patient is awake  PONV status at discharge: No PONV  Anesthetic complications: no      Cardiovascular status: hemodynamically stable  Respiratory status: spontaneous ventilation  Hydration status: euvolemic  Follow-up not needed.          Vitals Value Taken Time   /82 11/24/20 0931   Temp 36.2 °C (97.2 °F) 11/24/20 0915   Pulse 88 11/24/20 0934   Resp 31 11/24/20 0934   SpO2 92 % 11/24/20 0934   Vitals shown include unvalidated device data.      No case tracking events are documented in the log.      Pain/Ely Score: Pain Rating Prior to Med Admin: 8 (11/24/2020  9:30 AM)  Ely Score: 8 (11/24/2020  9:14 AM)         Adult

## 2023-01-26 NOTE — ASU DISCHARGE PLAN (ADULT/PEDIATRIC) - ASU DC SPECIAL INSTRUCTIONSFT
No
WOUND CARE:  Do NOT scrub, rub, or pick at your incision site  the lue with wear off in 5-7 days  do not get your incision wet for 3 days   AFTER 3 days you may shower:   - use mild soap and gentle warm stream, pat dry with towel  DO NOT apply lotions, powders, ointment, or perfumes to incision  wear loose clothing around incision for 7 days  f/u appointment as instructed     ACTIVITY:   resume normal activities    Follow heart healthy diet recommended by your doctor, , if you smoke STOP SMOKING ( may call [phone] for center of tobacco control if you need assistance)     ***CALL YOUR DOCTOR***  if you experience: fever, chills, body aches, or severe pain, swelling, redness, heat or yellow discharge at incision site  If you are unable to reach your doctor, you may contact Cardiology Office at [facility] at [phone]

## 2023-01-26 NOTE — ASU DISCHARGE PLAN (ADULT/PEDIATRIC) - NS MD DC FALL RISK RISK
For information on Fall & Injury Prevention, visit: [email or web address] OR  [email or web address] OR  https://www.cdc.gov/steadi/patient.html

## 2023-01-26 NOTE — H&P CARDIOLOGY - HISTORY OF PRESENT ILLNESS
This is a 57 year old  male with known ILR implant with COVID 19 negative vaccinated with PMHX of crytogenic stroke and MI in 2009, found at the time to have PFO s/p closure, recent TIA 9/2016 (L sided weakness, aphasia) , MI and PFO. Pt now presents for ILR Explant with Dr. Goldstein today due to end of life battery. No events >3 years. Currently Asymptomatic no sob no syed no palpitations noted.  < from: Nuclear Stress Test-Exercise (Nuclear Stress Test-Exercise .) (10.28.16 @ 09:45) >  IMPRESSIONS:Normal Study  * Exercise capacity: 12 METS, Excellent for age and  gender.  * Symptom: Fatigue.  * HR Response: Appropriate.  * BP Response: Appropriate.  * Heart Rhythm: Sinus Rhythm - Rare VPD's - 72 BPM.  * Conduction defects: incomplete right bundle brach block.  * ECG Changes: ST Depression: 1 mm upsloping in leads V3,  V4, V5, V6 started at 10:30 min of exercise at HR of 166  and persisted 01:00 min into recovery.  * Arrhythmia: None.  * The left ventricle was normal in size. Normal myocardial  perfusion scan, with no evidence of infarction or  inducible ischemia.  * Post-stress gated wall motion analysis was performed  (LVEF = 54 %;LVEDV = 135 ml.)  * No previous Nuclear/Stress exam.  ------------------------------------------------------------------------  Confirmed on  10/28/2016 - 16:53:48 by Carlos Cabrera M.D.  ------------------------------------------------------------------------    < end of copied text >  < from: Transthoracic Echocardiogram (10.28.16 @ 09:30) >  Conclusions:  1. Mild aortic root dilatation  (Ao: 4.2 cm at the sinuses  of Valsalva).  2. Normal left ventricular internal dimensions and wall  thicknesses.  3. Normal left ventricular systolic function. No segmental  wall motion abnormalities.  4. Mild diastolic dysfunction (Stage I).  5. Normal right ventricular size and function.  *** No previous Echo exam.  ------------------------------------------------------------------------  Confirmed on  10/28/2016 - 11:00:06 by Helga Spicer M.D.  ------------------------------------------------------------------------    < end of copied text >  < from: Cardiac Cath Lab (03.03.09 @ 12:44) >  Ventricles: Global left ventricular function was normal.EF estimated by  contrast ventriculography was 52 %.  Coronary vessels: The coronary circulation is right dominant.  LM:      LM: Normal.  LAD:      LAD: Normal.  D1: Normal.  CX:      OM1: Normal.  OM2: Normal.  RCA:      Mid RCA: Angiography showedminor luminal irregularities with no  flow limiting lesions.  RPDA: Normal.  RPLS: Normal.  Complications: There were no complications.  Summary:  Summary: Nonobstructive CAD. Normal LV function. Cannot r/o microvascular  or endothelial dysfunction.  Recommendations:  Daily aspirin.  Aggressive lipid lowering therapy (goal LDL less than 70mg/dL).  Beta blocker (Atenolol 25 mg daily).  Prepared and signed by  Dalton Cui M.D.  Signed 03/03/2009 14:23:51  Hemodynamic tables  Pressures:  Baseline  Pressures:  - HR: 60    < end of copied text >

## 2023-01-26 NOTE — ASU DISCHARGE PLAN (ADULT/PEDIATRIC) - CARE PROVIDER_API CALL
wound check,   2/13/23 at 2:40 pm  Phone: (   )    -  Fax: (   )    -  Scheduled Appointment: 02/13/2023 02:40 PM

## 2023-02-13 ENCOUNTER — APPOINTMENT (OUTPATIENT)
Dept: ELECTROPHYSIOLOGY | Facility: CLINIC | Age: 58
End: 2023-02-13

## 2023-03-29 ENCOUNTER — APPOINTMENT (OUTPATIENT)
Dept: CARDIOLOGY | Facility: CLINIC | Age: 58
End: 2023-03-29
Payer: COMMERCIAL

## 2023-03-29 ENCOUNTER — NON-APPOINTMENT (OUTPATIENT)
Age: 58
End: 2023-03-29

## 2023-03-29 VITALS
DIASTOLIC BLOOD PRESSURE: 77 MMHG | WEIGHT: 285 LBS | BODY MASS INDEX: 36.57 KG/M2 | TEMPERATURE: 92.3 F | HEART RATE: 64 BPM | RESPIRATION RATE: 20 BRPM | OXYGEN SATURATION: 96 % | HEIGHT: 74 IN | SYSTOLIC BLOOD PRESSURE: 115 MMHG

## 2023-03-29 PROCEDURE — 99214 OFFICE O/P EST MOD 30 MIN: CPT | Mod: 25

## 2023-03-29 PROCEDURE — 93000 ELECTROCARDIOGRAM COMPLETE: CPT

## 2023-03-29 NOTE — HISTORY OF PRESENT ILLNESS
[FreeTextEntry1] : Since last visit with me, he has been feeling well.\par He has been active.  He does stationary bicycle spinning at home 4 times a week.  Denies any cardiopulmonary limitations.\par No complaints of chest pain or chest pressure.  Denies palpitations.  No dizziness or lightheadedness.  No syncope.  No edema, no orthopnea.  No PND.\par ILR was explanted without any issue.\par \par

## 2023-03-29 NOTE — CARDIOLOGY SUMMARY
[___] : [unfilled] [de-identified] : March 29, 2023. Sinus Rhythm \par -RSR(V1) -nondiagnostic. \par \par PROBABLY NORMAL [de-identified] : Oct 11, 2022. Coronary calcium score 55.  Minimal CAD.  No moderate or severe CAD idenitified.

## 2023-03-29 NOTE — REASON FOR VISIT
[FreeTextEntry1] : Cardiology follow-up visit for evaluation and management of cardiac arrhythmia, history of CVA, PFO, status post PFO closure.\par \par

## 2023-03-29 NOTE — DISCUSSION/SUMMARY
[FreeTextEntry1] : 57-year-old man with history of cardiac arrhythmia, prior CVA, PFO, status postclosure.\par Coronary artery disease based on elevated coronary artery calcium score post coronary CTA.  No severe coronary artery disease identified.\par He has an active lifestyle and has been asymptomatic with respect to cardiac issues.\par Blood pressure stable.  No JVD.  Lung fields are clear.  No edema.  He is euvolemic.\par EKG sinus rhythm, within normal limits.\par Current cardiac status appears to be stable.\par \par Plan\par 1.  Current medication list is reviewed, no changes.\par 2.  Advised to continue to try to maintain an active aerobic lifestyle.\par 3.  Follow-up with me in the office in 1 year.\par 4.  Advised to monitor for any cardiac symptoms, notify me for any changes or if any other concerns.  Cardiac issues discussed, all questions answered.\par

## 2023-04-21 LAB
CHOLEST SERPL-MCNC: 160 MG/DL
HDLC SERPL-MCNC: 47 MG/DL
LDLC SERPL CALC-MCNC: 99 MG/DL
NONHDLC SERPL-MCNC: 113 MG/DL
TRIGL SERPL-MCNC: 71 MG/DL

## 2023-06-07 ENCOUNTER — RX RENEWAL (OUTPATIENT)
Age: 58
End: 2023-06-07

## 2023-11-03 ENCOUNTER — RX RENEWAL (OUTPATIENT)
Age: 58
End: 2023-11-03

## 2023-11-03 RX ORDER — FLUTICASONE PROPIONATE 50 UG/1
50 SPRAY, METERED NASAL
Qty: 16 | Refills: 5 | Status: ACTIVE | COMMUNITY
Start: 2021-11-05 | End: 1900-01-01

## 2023-11-07 ENCOUNTER — APPOINTMENT (OUTPATIENT)
Dept: GASTROENTEROLOGY | Facility: CLINIC | Age: 58
End: 2023-11-07
Payer: COMMERCIAL

## 2023-11-07 VITALS
BODY MASS INDEX: 25.41 KG/M2 | OXYGEN SATURATION: 97 % | DIASTOLIC BLOOD PRESSURE: 80 MMHG | HEART RATE: 73 BPM | HEIGHT: 74 IN | SYSTOLIC BLOOD PRESSURE: 120 MMHG | TEMPERATURE: 97.2 F | WEIGHT: 198 LBS

## 2023-11-07 DIAGNOSIS — Z12.12 ENCOUNTER FOR SCREENING FOR MALIGNANT NEOPLASM OF COLON: ICD-10-CM

## 2023-11-07 DIAGNOSIS — Z12.11 ENCOUNTER FOR SCREENING FOR MALIGNANT NEOPLASM OF COLON: ICD-10-CM

## 2023-11-07 DIAGNOSIS — R10.13 EPIGASTRIC PAIN: ICD-10-CM

## 2023-11-07 DIAGNOSIS — E66.3 OVERWEIGHT: ICD-10-CM

## 2023-11-07 PROCEDURE — 99204 OFFICE O/P NEW MOD 45 MIN: CPT

## 2023-11-16 NOTE — ASU PATIENT PROFILE, ADULT - BLOOD TRANSFUSION, PREVIOUS, PROFILE
Hi, my name is [facility]. I'm calling in regards to my son Gearl Hodgkins. I left a message last week as well and haven't heard back. I need to set him up with the nutritionist. He has a referral from Doctor Romayne Lavender. Phone number is [phone]. Thank you. rowan Chow.    Mom left voicemail on the line - I called mom back - gave her Central Scheduling number to call to schedule a nutritionist appointment. Mom understood and was thankful for information. no

## 2023-11-30 ENCOUNTER — NON-APPOINTMENT (OUTPATIENT)
Age: 58
End: 2023-11-30

## 2023-12-01 ENCOUNTER — RX RENEWAL (OUTPATIENT)
Age: 58
End: 2023-12-01

## 2023-12-01 RX ORDER — PITAVASTATIN CALCIUM 4 MG/1
4 TABLET ORAL
Qty: 90 | Refills: 3 | Status: ACTIVE | COMMUNITY
Start: 1900-01-01 | End: 1900-01-01

## 2023-12-04 ENCOUNTER — OUTPATIENT (OUTPATIENT)
Dept: OUTPATIENT SERVICES | Facility: HOSPITAL | Age: 58
LOS: 1 days | End: 2023-12-04
Payer: COMMERCIAL

## 2023-12-04 ENCOUNTER — RESULT REVIEW (OUTPATIENT)
Age: 58
End: 2023-12-04

## 2023-12-04 ENCOUNTER — APPOINTMENT (OUTPATIENT)
Dept: GASTROENTEROLOGY | Facility: HOSPITAL | Age: 58
End: 2023-12-04

## 2023-12-04 DIAGNOSIS — Z12.12 ENCOUNTER FOR SCREENING FOR MALIGNANT NEOPLASM OF RECTUM: ICD-10-CM

## 2023-12-04 PROCEDURE — 88305 TISSUE EXAM BY PATHOLOGIST: CPT

## 2023-12-04 PROCEDURE — 88305 TISSUE EXAM BY PATHOLOGIST: CPT | Mod: 26

## 2023-12-04 PROCEDURE — 45385 COLONOSCOPY W/LESION REMOVAL: CPT | Mod: PT

## 2023-12-04 PROCEDURE — 45385 COLONOSCOPY W/LESION REMOVAL: CPT | Mod: 33

## 2023-12-04 PROCEDURE — 45380 COLONOSCOPY AND BIOPSY: CPT | Mod: 59

## 2023-12-04 PROCEDURE — 45380 COLONOSCOPY AND BIOPSY: CPT | Mod: PT,XS

## 2023-12-04 RX ORDER — SODIUM CHLORIDE 9 MG/ML
500 INJECTION INTRAMUSCULAR; INTRAVENOUS; SUBCUTANEOUS
Refills: 0 | Status: COMPLETED | OUTPATIENT
Start: 2023-12-04 | End: 2023-12-04

## 2023-12-04 RX ADMIN — SODIUM CHLORIDE 75 MILLILITER(S): 9 INJECTION INTRAMUSCULAR; INTRAVENOUS; SUBCUTANEOUS at 12:51

## 2023-12-06 LAB
SURGICAL PATHOLOGY STUDY: SIGNIFICANT CHANGE UP
SURGICAL PATHOLOGY STUDY: SIGNIFICANT CHANGE UP

## 2023-12-08 ENCOUNTER — NON-APPOINTMENT (OUTPATIENT)
Age: 58
End: 2023-12-08

## 2024-03-28 ENCOUNTER — RX RENEWAL (OUTPATIENT)
Age: 59
End: 2024-03-28

## 2024-03-29 ENCOUNTER — APPOINTMENT (OUTPATIENT)
Dept: CARDIOLOGY | Facility: CLINIC | Age: 59
End: 2024-03-29
Payer: COMMERCIAL

## 2024-03-29 ENCOUNTER — NON-APPOINTMENT (OUTPATIENT)
Age: 59
End: 2024-03-29

## 2024-03-29 VITALS
HEART RATE: 73 BPM | HEIGHT: 74 IN | WEIGHT: 288 LBS | OXYGEN SATURATION: 93 % | DIASTOLIC BLOOD PRESSURE: 75 MMHG | SYSTOLIC BLOOD PRESSURE: 109 MMHG | BODY MASS INDEX: 36.96 KG/M2 | RESPIRATION RATE: 19 BRPM

## 2024-03-29 DIAGNOSIS — I47.19 OTHER SUPRAVENTRICULAR TACHYCARDIA: ICD-10-CM

## 2024-03-29 PROCEDURE — 93000 ELECTROCARDIOGRAM COMPLETE: CPT

## 2024-03-29 PROCEDURE — 99213 OFFICE O/P EST LOW 20 MIN: CPT | Mod: 25

## 2024-03-29 NOTE — HISTORY OF PRESENT ILLNESS
[FreeTextEntry1] : Since last visit with me, he has been at his baseline. He remains active.  Uses stationary bike at home. He tries to diet appropriately but has not lost any weight. Recent increase in personal stress, he lost his sister last month ALS. No complaints of chest pain or chest pressure.  No shortness of breath.  No palpitations.  No dizziness or lightheadedness.  No syncope.  No edema, orthopnea.  No PND. Reports compliance to current medications.

## 2024-03-29 NOTE — CARDIOLOGY SUMMARY
[___] : [unfilled] [de-identified] : Oct 11, 2022. Coronary calcium score 55.  Minimal CAD.  No moderate or severe CAD idenitified.  [de-identified] : March 29 2024. Sinus Rhythm  Low voltage in limb leads. -Incomplete right bundle branch block

## 2024-03-29 NOTE — DISCUSSION/SUMMARY
[FreeTextEntry1] : 58-year-old man with cardiac history of arrhythmia, status post CVA, history of PFO, status post PFO closure. Medical issues include obesity. He has a good functional capacity has been asymptomatic with respect to cardiac issues. On physical examination today, blood pressure stable.  He is euvolemic.  No significant cardiac murmurs rubs or gallops are noted. EKG is normal sinus rhythm, incomplete right bundle branch block. Current cardiac status appears to be stable.  Plan 1.  Current medications are reviewed, no changes. 2.  He anticipates having blood work with primary care provider in the near future.  Patient plans to discuss possible GLP-1 agonist rx  3.  He will follow-up with me in the office in 1 year. 4.  Advised him to continue to maintain an active aerobic lifestyle. 5.  Advised to monitor for any cardiac symptoms and to report back for any changes or if he has any other concerns.  Cardiac issues were discussed, all questions answered.

## 2024-03-29 NOTE — REASON FOR VISIT
[FreeTextEntry1] : Cardiology follow-up visit for history of cardiac arrhythmia, history of CVA, PFO, status post PFO closure.

## 2024-04-19 LAB
24R-OH-CALCIDIOL SERPL-MCNC: 36.6 PG/ML
ALBUMIN SERPL ELPH-MCNC: 4.4 G/DL
ALP BLD-CCNC: 83 U/L
ALT SERPL-CCNC: 19 U/L
ANION GAP SERPL CALC-SCNC: 11 MMOL/L
APPEARANCE: CLEAR
AST SERPL-CCNC: 19 U/L
BASOPHILS # BLD AUTO: 0.07 K/UL
BASOPHILS NFR BLD AUTO: 0.9 %
BILIRUB SERPL-MCNC: 0.4 MG/DL
BILIRUBIN URINE: NEGATIVE
BLOOD URINE: NEGATIVE
BUN SERPL-MCNC: 16 MG/DL
CALCIUM SERPL-MCNC: 9.6 MG/DL
CHLORIDE SERPL-SCNC: 105 MMOL/L
CHOLEST SERPL-MCNC: 158 MG/DL
CO2 SERPL-SCNC: 25 MMOL/L
COLOR: YELLOW
CREAT SERPL-MCNC: 1.18 MG/DL
EGFR: 71 ML/MIN/1.73M2
EOSINOPHIL # BLD AUTO: 0.23 K/UL
EOSINOPHIL NFR BLD AUTO: 3 %
ESTIMATED AVERAGE GLUCOSE: 111 MG/DL
GLUCOSE QUALITATIVE U: NEGATIVE MG/DL
GLUCOSE SERPL-MCNC: 111 MG/DL
HBA1C MFR BLD HPLC: 5.5 %
HCT VFR BLD CALC: 45.5 %
HDLC SERPL-MCNC: 51 MG/DL
HGB BLD-MCNC: 15.9 G/DL
IMM GRANULOCYTES NFR BLD AUTO: 0.3 %
KETONES URINE: NEGATIVE MG/DL
LDLC SERPL CALC-MCNC: 92 MG/DL
LEUKOCYTE ESTERASE URINE: NEGATIVE
LYMPHOCYTES # BLD AUTO: 2.44 K/UL
LYMPHOCYTES NFR BLD AUTO: 32.3 %
MAGNESIUM SERPL-MCNC: 2.4 MG/DL
MAN DIFF?: NORMAL
MCHC RBC-ENTMCNC: 31.5 PG
MCHC RBC-ENTMCNC: 34.9 GM/DL
MCV RBC AUTO: 90.3 FL
MONOCYTES # BLD AUTO: 0.67 K/UL
MONOCYTES NFR BLD AUTO: 8.9 %
NEUTROPHILS # BLD AUTO: 4.13 K/UL
NEUTROPHILS NFR BLD AUTO: 54.6 %
NITRITE URINE: NEGATIVE
NONHDLC SERPL-MCNC: 106 MG/DL
PH URINE: 5.5
PHOSPHATE SERPL-MCNC: 2.8 MG/DL
PLATELET # BLD AUTO: 226 K/UL
POTASSIUM SERPL-SCNC: 5 MMOL/L
PROT SERPL-MCNC: 7 G/DL
PROTEIN URINE: NEGATIVE MG/DL
PSA FREE FLD-MCNC: 48 %
PSA FREE SERPL-MCNC: 0.45 NG/ML
PSA SERPL-MCNC: 0.93 NG/ML
RBC # BLD: 5.04 M/UL
RBC # FLD: 13.1 %
SODIUM SERPL-SCNC: 142 MMOL/L
SPECIFIC GRAVITY URINE: 1.02
TRIGL SERPL-MCNC: 75 MG/DL
TSH SERPL-ACNC: 2.19 UIU/ML
UROBILINOGEN URINE: 0.2 MG/DL
WBC # FLD AUTO: 7.56 K/UL

## 2024-04-25 ENCOUNTER — NON-APPOINTMENT (OUTPATIENT)
Age: 59
End: 2024-04-25

## 2024-04-26 ENCOUNTER — APPOINTMENT (OUTPATIENT)
Dept: INTERNAL MEDICINE | Facility: CLINIC | Age: 59
End: 2024-04-26
Payer: COMMERCIAL

## 2024-04-26 VITALS
WEIGHT: 290 LBS | BODY MASS INDEX: 37.22 KG/M2 | DIASTOLIC BLOOD PRESSURE: 72 MMHG | HEIGHT: 74 IN | TEMPERATURE: 97 F | HEART RATE: 65 BPM | SYSTOLIC BLOOD PRESSURE: 106 MMHG | OXYGEN SATURATION: 99 % | RESPIRATION RATE: 18 BRPM

## 2024-04-26 DIAGNOSIS — E66.9 OBESITY, UNSPECIFIED: ICD-10-CM

## 2024-04-26 PROCEDURE — 99396 PREV VISIT EST AGE 40-64: CPT

## 2024-04-26 RX ORDER — TIRZEPATIDE 5 MG/.5ML
5 INJECTION, SOLUTION SUBCUTANEOUS
Qty: 1 | Refills: 5 | Status: ACTIVE | COMMUNITY
Start: 2024-04-26 | End: 1900-01-01

## 2024-04-26 RX ORDER — SODIUM SULFATE, MAGNESIUM SULFATE, AND POTASSIUM CHLORIDE 17.75; 2.7; 2.25 G/1; G/1; G/1
1479-225-188 TABLET ORAL
Qty: 1 | Refills: 0 | Status: DISCONTINUED | COMMUNITY
Start: 2023-11-07 | End: 2024-04-26

## 2024-04-26 NOTE — COUNSELING
[Potential consequences of obesity discussed] : Potential consequences of obesity discussed [Benefits of weight loss discussed] : Benefits of weight loss discussed [Structured Weight Management Program suggested:] : Structured weight management program suggested [FreeTextEntry1] : Start Tirzepatide

## 2024-04-26 NOTE — HEALTH RISK ASSESSMENT
[Excellent] : ~his/her~  mood as  excellent [Yes] : Yes [Monthly or less (1 pt)] : Monthly or less (1 point) [1 or 2 (0 pts)] : 1 or 2 (0 points) [Never (0 pts)] : Never (0 points) [No] : In the past 12 months have you used drugs other than those required for medical reasons? No [No falls in past year] : Patient reported no falls in the past year [0] : 2) Feeling down, depressed, or hopeless: Not at all (0) [Patient reported colonoscopy was normal] : Patient reported colonoscopy was normal [None] : None [With Family] : lives with family [Employed] : employed [] :  [# Of Children ___] : has [unfilled] children [Feels Safe at Home] : Feels safe at home [Fully functional (bathing, dressing, toileting, transferring, walking, feeding)] : Fully functional (bathing, dressing, toileting, transferring, walking, feeding) [Fully functional (using the telephone, shopping, preparing meals, housekeeping, doing laundry, using] : Fully functional and needs no help or supervision to perform IADLs (using the telephone, shopping, preparing meals, housekeeping, doing laundry, using transportation, managing medications and managing finances) [Reports normal functional visual acuity (ie: able to read med bottle)] : Reports normal functional visual acuity [Smoke Detector] : smoke detector [Seat Belt] :  uses seat belt [Never] : Never [PHQ-2 Negative - No further assessment needed] : PHQ-2 Negative - No further assessment needed [FreeTextEntry1] : obesity;  [de-identified] : biking [de-identified] : regular diet  [DEX8Gyizs] : 0 [Change in mental status noted] : No change in mental status noted [Language] : denies difficulty with language [Behavior] : denies difficulty with behavior [Sexually Active] : not sexually active [High Risk Behavior] : no high risk behavior [Reports changes in hearing] : Reports no changes in hearing [Reports changes in vision] : Reports no changes in vision [Reports changes in dental health] : Reports no changes in dental health [Travel to Developing Areas] : does not  travel to developing areas [ColonoscopyDate] : 2023 [ColonoscopyComments] : Repeat 5 years.

## 2024-06-20 ENCOUNTER — EMERGENCY (EMERGENCY)
Facility: HOSPITAL | Age: 59
LOS: 1 days | Discharge: HOME | End: 2024-06-20
Attending: EMERGENCY MEDICINE
Payer: COMMERCIAL

## 2024-06-20 VITALS
HEART RATE: 60 BPM | OXYGEN SATURATION: 95 % | DIASTOLIC BLOOD PRESSURE: 81 MMHG | SYSTOLIC BLOOD PRESSURE: 120 MMHG | TEMPERATURE: 98 F | RESPIRATION RATE: 20 BRPM

## 2024-06-20 VITALS
OXYGEN SATURATION: 95 % | DIASTOLIC BLOOD PRESSURE: 83 MMHG | TEMPERATURE: 98 F | SYSTOLIC BLOOD PRESSURE: 111 MMHG | WEIGHT: 285.06 LBS | RESPIRATION RATE: 18 BRPM | HEART RATE: 79 BPM

## 2024-06-20 LAB
ALBUMIN SERPL ELPH-MCNC: 4.4 G/DL — SIGNIFICANT CHANGE UP (ref 3.3–5)
ALP SERPL-CCNC: 96 U/L — SIGNIFICANT CHANGE UP (ref 40–120)
ALT FLD-CCNC: 35 U/L — SIGNIFICANT CHANGE UP (ref 10–45)
ANION GAP SERPL CALC-SCNC: 12 MMOL/L — SIGNIFICANT CHANGE UP (ref 5–17)
APPEARANCE UR: CLEAR — SIGNIFICANT CHANGE UP
APTT BLD: 30 SEC — SIGNIFICANT CHANGE UP (ref 24.5–35.6)
AST SERPL-CCNC: 30 U/L — SIGNIFICANT CHANGE UP (ref 10–40)
BACTERIA # UR AUTO: NEGATIVE /HPF — SIGNIFICANT CHANGE UP
BASE EXCESS BLDV CALC-SCNC: 0.9 MMOL/L — SIGNIFICANT CHANGE UP (ref -2–3)
BASOPHILS # BLD AUTO: 0.06 K/UL — SIGNIFICANT CHANGE UP (ref 0–0.2)
BASOPHILS NFR BLD AUTO: 0.8 % — SIGNIFICANT CHANGE UP (ref 0–2)
BILIRUB SERPL-MCNC: 0.6 MG/DL — SIGNIFICANT CHANGE UP (ref 0.2–1.2)
BILIRUB UR-MCNC: NEGATIVE — SIGNIFICANT CHANGE UP
BUN SERPL-MCNC: 13 MG/DL — SIGNIFICANT CHANGE UP (ref 7–23)
CA-I SERPL-SCNC: 1.18 MMOL/L — SIGNIFICANT CHANGE UP (ref 1.15–1.33)
CALCIUM SERPL-MCNC: 9.2 MG/DL — SIGNIFICANT CHANGE UP (ref 8.4–10.5)
CAST: 0 /LPF — SIGNIFICANT CHANGE UP (ref 0–4)
CHLORIDE BLDV-SCNC: 99 MMOL/L — SIGNIFICANT CHANGE UP (ref 96–108)
CHLORIDE SERPL-SCNC: 104 MMOL/L — SIGNIFICANT CHANGE UP (ref 96–108)
CO2 BLDV-SCNC: 29 MMOL/L — HIGH (ref 22–26)
CO2 SERPL-SCNC: 24 MMOL/L — SIGNIFICANT CHANGE UP (ref 22–31)
COLOR SPEC: YELLOW — SIGNIFICANT CHANGE UP
CREAT SERPL-MCNC: 1 MG/DL — SIGNIFICANT CHANGE UP (ref 0.5–1.3)
DIFF PNL FLD: NEGATIVE — SIGNIFICANT CHANGE UP
EGFR: 87 ML/MIN/1.73M2 — SIGNIFICANT CHANGE UP
EOSINOPHIL # BLD AUTO: 0.13 K/UL — SIGNIFICANT CHANGE UP (ref 0–0.5)
EOSINOPHIL NFR BLD AUTO: 1.8 % — SIGNIFICANT CHANGE UP (ref 0–6)
FLUAV AG NPH QL: SIGNIFICANT CHANGE UP
FLUBV AG NPH QL: SIGNIFICANT CHANGE UP
GAS PNL BLDV: 131 MMOL/L — LOW (ref 136–145)
GAS PNL BLDV: SIGNIFICANT CHANGE UP
GAS PNL BLDV: SIGNIFICANT CHANGE UP
GLUCOSE BLDV-MCNC: 90 MG/DL — SIGNIFICANT CHANGE UP (ref 70–99)
GLUCOSE SERPL-MCNC: 118 MG/DL — HIGH (ref 70–99)
GLUCOSE UR QL: NEGATIVE MG/DL — SIGNIFICANT CHANGE UP
HCO3 BLDV-SCNC: 27 MMOL/L — SIGNIFICANT CHANGE UP (ref 22–29)
HCT VFR BLD CALC: 46.4 % — SIGNIFICANT CHANGE UP (ref 39–50)
HCT VFR BLDA CALC: 47 % — SIGNIFICANT CHANGE UP (ref 39–51)
HGB BLD CALC-MCNC: 15.5 G/DL — SIGNIFICANT CHANGE UP (ref 12.6–17.4)
HGB BLD-MCNC: 16 G/DL — SIGNIFICANT CHANGE UP (ref 13–17)
IMM GRANULOCYTES NFR BLD AUTO: 0.3 % — SIGNIFICANT CHANGE UP (ref 0–0.9)
INR BLD: 1.15 RATIO — SIGNIFICANT CHANGE UP (ref 0.85–1.18)
KETONES UR-MCNC: NEGATIVE MG/DL — SIGNIFICANT CHANGE UP
LACTATE BLDV-MCNC: 1.1 MMOL/L — SIGNIFICANT CHANGE UP (ref 0.5–2)
LEUKOCYTE ESTERASE UR-ACNC: NEGATIVE — SIGNIFICANT CHANGE UP
LYMPHOCYTES # BLD AUTO: 1.78 K/UL — SIGNIFICANT CHANGE UP (ref 1–3.3)
LYMPHOCYTES # BLD AUTO: 24 % — SIGNIFICANT CHANGE UP (ref 13–44)
MCHC RBC-ENTMCNC: 32.3 PG — SIGNIFICANT CHANGE UP (ref 27–34)
MCHC RBC-ENTMCNC: 34.5 GM/DL — SIGNIFICANT CHANGE UP (ref 32–36)
MCV RBC AUTO: 93.7 FL — SIGNIFICANT CHANGE UP (ref 80–100)
MONOCYTES # BLD AUTO: 1.17 K/UL — HIGH (ref 0–0.9)
MONOCYTES NFR BLD AUTO: 15.8 % — HIGH (ref 2–14)
NEUTROPHILS # BLD AUTO: 4.26 K/UL — SIGNIFICANT CHANGE UP (ref 1.8–7.4)
NEUTROPHILS NFR BLD AUTO: 57.3 % — SIGNIFICANT CHANGE UP (ref 43–77)
NITRITE UR-MCNC: NEGATIVE — SIGNIFICANT CHANGE UP
NRBC # BLD: 0 /100 WBCS — SIGNIFICANT CHANGE UP (ref 0–0)
PCO2 BLDV: 48 MMHG — SIGNIFICANT CHANGE UP (ref 42–55)
PH BLDV: 7.36 — SIGNIFICANT CHANGE UP (ref 7.32–7.43)
PH UR: 6 — SIGNIFICANT CHANGE UP (ref 5–8)
PLATELET # BLD AUTO: 178 K/UL — SIGNIFICANT CHANGE UP (ref 150–400)
PO2 BLDV: 35 MMHG — SIGNIFICANT CHANGE UP (ref 25–45)
POTASSIUM BLDV-SCNC: 3.9 MMOL/L — SIGNIFICANT CHANGE UP (ref 3.5–5.1)
POTASSIUM SERPL-MCNC: 3.8 MMOL/L — SIGNIFICANT CHANGE UP (ref 3.5–5.3)
POTASSIUM SERPL-SCNC: 3.8 MMOL/L — SIGNIFICANT CHANGE UP (ref 3.5–5.3)
PROT SERPL-MCNC: 7.7 G/DL — SIGNIFICANT CHANGE UP (ref 6–8.3)
PROT UR-MCNC: 30 MG/DL
PROTHROM AB SERPL-ACNC: 12 SEC — SIGNIFICANT CHANGE UP (ref 9.5–13)
RBC # BLD: 4.95 M/UL — SIGNIFICANT CHANGE UP (ref 4.2–5.8)
RBC # FLD: 12.8 % — SIGNIFICANT CHANGE UP (ref 10.3–14.5)
RBC CASTS # UR COMP ASSIST: 1 /HPF — SIGNIFICANT CHANGE UP (ref 0–4)
RSV RNA NPH QL NAA+NON-PROBE: SIGNIFICANT CHANGE UP
SAO2 % BLDV: 64.6 % — LOW (ref 67–88)
SARS-COV-2 RNA SPEC QL NAA+PROBE: DETECTED
SODIUM SERPL-SCNC: 140 MMOL/L — SIGNIFICANT CHANGE UP (ref 135–145)
SP GR SPEC: >1.03 — HIGH (ref 1–1.03)
SQUAMOUS # UR AUTO: 0 /HPF — SIGNIFICANT CHANGE UP (ref 0–5)
TROPONIN T, HIGH SENSITIVITY RESULT: 7 NG/L — SIGNIFICANT CHANGE UP (ref 0–51)
UROBILINOGEN FLD QL: 1 MG/DL — SIGNIFICANT CHANGE UP (ref 0.2–1)
WBC # BLD: 7.42 K/UL — SIGNIFICANT CHANGE UP (ref 3.8–10.5)
WBC # FLD AUTO: 7.42 K/UL — SIGNIFICANT CHANGE UP (ref 3.8–10.5)
WBC UR QL: 0 /HPF — SIGNIFICANT CHANGE UP (ref 0–5)

## 2024-06-20 PROCEDURE — 0042T: CPT | Mod: MC

## 2024-06-20 PROCEDURE — 99285 EMERGENCY DEPT VISIT HI MDM: CPT | Mod: 25

## 2024-06-20 PROCEDURE — 85014 HEMATOCRIT: CPT

## 2024-06-20 PROCEDURE — 70450 CT HEAD/BRAIN W/O DYE: CPT | Mod: MC

## 2024-06-20 PROCEDURE — 85610 PROTHROMBIN TIME: CPT

## 2024-06-20 PROCEDURE — 71045 X-RAY EXAM CHEST 1 VIEW: CPT

## 2024-06-20 PROCEDURE — 84132 ASSAY OF SERUM POTASSIUM: CPT

## 2024-06-20 PROCEDURE — 82947 ASSAY GLUCOSE BLOOD QUANT: CPT

## 2024-06-20 PROCEDURE — 85018 HEMOGLOBIN: CPT

## 2024-06-20 PROCEDURE — 84484 ASSAY OF TROPONIN QUANT: CPT

## 2024-06-20 PROCEDURE — 70496 CT ANGIOGRAPHY HEAD: CPT | Mod: MC

## 2024-06-20 PROCEDURE — 83605 ASSAY OF LACTIC ACID: CPT

## 2024-06-20 PROCEDURE — 85025 COMPLETE CBC W/AUTO DIFF WBC: CPT

## 2024-06-20 PROCEDURE — 82962 GLUCOSE BLOOD TEST: CPT

## 2024-06-20 PROCEDURE — 82330 ASSAY OF CALCIUM: CPT

## 2024-06-20 PROCEDURE — 87637 SARSCOV2&INF A&B&RSV AMP PRB: CPT

## 2024-06-20 PROCEDURE — 99285 EMERGENCY DEPT VISIT HI MDM: CPT

## 2024-06-20 PROCEDURE — 71045 X-RAY EXAM CHEST 1 VIEW: CPT | Mod: 26

## 2024-06-20 PROCEDURE — 70496 CT ANGIOGRAPHY HEAD: CPT | Mod: 26,MC

## 2024-06-20 PROCEDURE — 36000 PLACE NEEDLE IN VEIN: CPT | Mod: XU

## 2024-06-20 PROCEDURE — 87086 URINE CULTURE/COLONY COUNT: CPT

## 2024-06-20 PROCEDURE — 82435 ASSAY OF BLOOD CHLORIDE: CPT

## 2024-06-20 PROCEDURE — 80053 COMPREHEN METABOLIC PANEL: CPT

## 2024-06-20 PROCEDURE — 82803 BLOOD GASES ANY COMBINATION: CPT

## 2024-06-20 PROCEDURE — 84295 ASSAY OF SERUM SODIUM: CPT

## 2024-06-20 PROCEDURE — 70450 CT HEAD/BRAIN W/O DYE: CPT | Mod: 26,MC,59

## 2024-06-20 PROCEDURE — 81001 URINALYSIS AUTO W/SCOPE: CPT

## 2024-06-20 PROCEDURE — 93005 ELECTROCARDIOGRAM TRACING: CPT

## 2024-06-20 PROCEDURE — 70498 CT ANGIOGRAPHY NECK: CPT | Mod: MC

## 2024-06-20 PROCEDURE — 85730 THROMBOPLASTIN TIME PARTIAL: CPT

## 2024-06-20 PROCEDURE — 70498 CT ANGIOGRAPHY NECK: CPT | Mod: 26,MC

## 2024-06-20 RX ORDER — SODIUM CHLORIDE 9 MG/ML
1000 INJECTION INTRAMUSCULAR; INTRAVENOUS; SUBCUTANEOUS ONCE
Refills: 0 | Status: COMPLETED | OUTPATIENT
Start: 2024-06-20 | End: 2024-06-20

## 2024-06-20 RX ORDER — CLOPIDOGREL BISULFATE 75 MG/1
1 TABLET, FILM COATED ORAL
Qty: 21 | Refills: 0
Start: 2024-06-20 | End: 2024-07-10

## 2024-06-20 RX ADMIN — SODIUM CHLORIDE 1000 MILLILITER(S): 9 INJECTION INTRAMUSCULAR; INTRAVENOUS; SUBCUTANEOUS at 13:53

## 2024-06-20 NOTE — ED PROVIDER NOTE - IV ALTEPLASE EXCL ABS HIDDEN
RN left 3rd VM. RN requested return call to confirm receiving this message.  RN instructed Pt to have her pharmacy fax over her refill requests and these would be addressed as normal.   Pt asked to also leave any other questions or needs.    Pt has not been seen since 11/2019. Cancelled by Pt on 2/3/20. Second message left for Pt noting her request would be routed to Dr. Burton for advisement regarding any refills.    Routing to Dr. Burton regarding Pt's request for medication refills. Pt noted no current insurance, to cover an appt.    show

## 2024-06-20 NOTE — ED ADULT NURSE NOTE - CAS EDN INTEG ASSESS
- - - You were seen in the Emergency Department for:    You were prescribed to the pharmacy:  Please follow the instructions on the container/label and ask your pharmacist for any questions/concerns.    For pain, you may take Tylenol (acetaminophen) 975 mg every 6 hours, AND/OR Advil (ibuprofen) 600 mg every 8 hours.    Please follow up with your primary physician. If you do not have a primary physician or specialist of your needs, please call [phone] to find one convenient for you. At this number you will be able to locate a provider who accepts your insurance, as well as locate the right specialist for your needs.    You should return to the Emergency Department if you feel any new/worsening/persistent symptoms including but not limited to: chest pain, difficulty breathing, loss of consciousness, bleeding, uncontrolled pain, numbness/weakness of a body part

## 2024-06-20 NOTE — ED PROVIDER NOTE - PATIENT PORTAL LINK FT
You can access the FollowMyHealth Patient Portal offered by [facility] by registering at the following website: [email or web address]. By joining [employer]’s FollowMyHealth portal, you will also be able to view your health information using other applications (apps) compatible with our system.
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
## 2024-06-20 NOTE — ED PROVIDER NOTE - CLINICAL SUMMARY MEDICAL DECISION MAKING FREE TEXT BOX
58Y M PMH CVA, prior MI, PFO s/p repair, discharged from hospital yesterday here with generalized weakness and difficulty ambulating. x1 hour. Called as code stroke at triage, neuro team at bedside. NIHSS 0 currently. No focal deficits noted. Plan CTAs/perfusion study, labs, reassess. 58Y M PMH CVA, prior MI, PFO s/p repair, discharged from hospital yesterday here with generalized weakness and difficulty ambulating. x1 hour. Called as code stroke at triage, neuro team at bedside. NIHSS 0 currently. No focal deficits noted. Plan CTAs/perfusion study, labs, reassess.    bakari: 58 year old male with cva, prior MI, PFO, s/p repair discharged from hospital in [place] yesterday for stroke (left parietal occiptal stroke) here for generalieed weakness, difficulty walking x 1 hour also with R sided tingling. code sroke at triage. PE: att exam: patient awake alert NAD . LUNGS CTAB no wheeze no crackle. CARD RRR no m/r/g.  Abdomen soft NT ND no rebound no guarding no CVA tenderness. EXT WWP no edema no calf tenderness CV 2+DP/PT bilaterally. neuro A&Ox3 gait normal.  skin warm and dry no rash  plan: will get ct imaging, urine, infectious work up to r/o resurgence of symptoms, r/ro new stroke. neuro at bedside. will reassess

## 2024-06-20 NOTE — ED ADULT NURSE NOTE - CHIEF COMPLAINT QUOTE
59 yo M pt with Embolic stroke Monday with R sided weakness symptoms resolved and returned today after standing c/o loss of balance light headed and R sided weakness starting at 10:45.

## 2024-06-20 NOTE — ED PROVIDER NOTE - PROGRESS NOTE DETAILS
Elysia Perez MD PGY-2: Elysia Perez MD PGY-2: CT/CTAs negative for acute process. Found to be COVID positive, suspect viral illness led to recrudescence of stroke symptoms. patient feeling better at this time. has outpatient neuro f/u scheduled. return and f/u precautions were discussed and pt dc home in stable condition.

## 2024-06-20 NOTE — ED ADULT NURSE NOTE - ED CARDIAC RHYTHM
Pt states she needs a refill on estradiol vaginal tablets sent to [facility] in [place]. Thank you!   regular

## 2024-06-20 NOTE — CONSULT NOTE ADULT - ASSESSMENT
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  Patient woke up without weakness or numbness however had sudden onset of RLE >RUE numbness. Patient denies any HA, new weakness, change in speech or gait, facial asymmetry.     Of note, patient stated that he was discharged from [facility] yesterday for similar event. He presented to [facility] on 6/17/24 for R sided weakness/numbness, exam showed slight decrease to LT in R toes and distal sole of foot. W/u included CTA H/N which reportedly normal, TTE with bubble EF 55-60%, no shunt noted. W/u also included hypercoag panel (sent and pending). He was loaded with aspirin 325 yesterday and told to start 81mg however he has not taken his first dose yet.     As per wife, patient has an appt with EP tomorrow and already scheduled o/p follow up with Neurologist Dr. Marie Bruno     CTH showed old L occipital lobe infarct No acute findings.   LKW 11am.   NIHSS: 0  Baseline mRS: 0   Not a tenecteplase candidate due to low NIH    Impression: Subjective R sided numbness i/s/o recent L LUMA ischemic stroke most likely recrudescence. Low suspicion for new ischemic event     Recommendations:     Imaging/Labs  [] F/u CTA H/N to look at the cerebral vasculature     Meds  [] ASA81/[id number] for 3 weeks followed by ASA 81 alone indefinitely per CHANCE trial   [] Atorvastatin 80MG QHS, titrate to LDL<70    Other  [] Telemonitoring; Neurochecks and vital signs per unit protocol, Q4H  [] Goal for BP normotension.   [] BG goal <180, avoid hypoglycemia  [] NPO until clears dysphagia screen, otherwise swallow evaluation  [] Fall, aspiration precautions  [] Stroke education provided    Above mentioned plan was discussed with patient and available family member in detail. All the questions were answered and concerns were addressed.    Case discussed with stroke attending Dr. Shelley     Case to be seen by stroke attending during AM rounds     58y (1965) LEFT handed man with a PMHx significant for R occipital lobe stroke (2009 with slight residual left superior quadrantanopia) thought to be 2/2 paradoxical embolism s/p PFO closure and possibly i/s/o MI, CAD, vtach, recent episode of R sided weakness/numbness 2/2 small L luma stroke currently on aspirin monotherapy presented to the ED for recurrence of R sided numbness.  Patient woke up without weakness or numbness however had sudden onset of RLE >RUE numbness. Patient denies any HA, new weakness, change in speech or gait, facial asymmetry.     Of note, patient stated that he was discharged from [facility] yesterday for similar event. He presented to [facility] on 6/17/24 for R sided weakness/numbness, exam showed slight decrease to LT in R toes and distal sole of foot. W/u included CTA H/N which reportedly normal, TTE with bubble EF 55-60%, no shunt noted. W/u also included hypercoag panel (sent and pending). He was loaded with aspirin 325 yesterday and told to start 81mg however he has not taken his first dose yet.     As per wife, patient has an appt with EP tomorrow and already scheduled o/p follow up with Neurologist Dr. Marie Bruno     CTH showed old L occipital lobe infarct No acute findings.   LKW 11am.   NIHSS: 0  Baseline mRS: 0   Not a tenecteplase candidate due to low NIH    Impression: Subjective R sided numbness i/s/o recent L LUMA ischemic stroke most likely recrudescence. Low suspicion for new ischemic event     Recommendations:     Imaging/Labs  [] F/u CTA H/N to look at the cerebral vasculature     Meds  [] ASA81/[id number] for 3 weeks followed by ASA 81 alone indefinitely per CHANCE trial   [] Atorvastatin 80MG QHS, titrate to LDL<70    Other  [] Telemonitoring; Neurochecks and vital signs per unit protocol, Q4H  [] Goal for BP normotension.   [] BG goal <180, avoid hypoglycemia  [] NPO until clears dysphagia screen, otherwise swallow evaluation  [] Fall, aspiration precautions  [] Stroke education provided    Above mentioned plan was discussed with patient and available family member in detail. All the questions were answered and concerns were addressed.    Patient can follow up with Dr. Danis Shelley at [address] [place] [address] 1-2 weeks after discharge. Please instruct the patient to call [phone] to schedule this appointment.     Case discussed with stroke attending Dr. Shelley     Case to be seen by stroke attending during AM rounds     58y (1965) LEFT handed man with a PMHx significant for R occipital lobe stroke (2009 with slight residual left superior quadrantanopia) thought to be 2/2 paradoxical embolism s/p PFO closure and possibly i/s/o MI, CAD, vtach, recent episode of R sided weakness/numbness 2/2 small L luma stroke currently on aspirin monotherapy presented to the ED for recurrence of R sided numbness.  Patient woke up without weakness or numbness however had sudden onset of RLE >RUE numbness. Patient denies any HA, new weakness, change in speech or gait, facial asymmetry.     Of note, patient stated that he was discharged from [facility] yesterday for similar event. He presented to [facility] on 6/17/24 for R sided weakness/numbness, exam showed slight decrease to LT in R toes and distal sole of foot. W/u included CTA H/N which reportedly normal, TTE with bubble EF 55-60%, no shunt noted. W/u also included hypercoag panel (sent and pending). He was loaded with aspirin 325 yesterday and told to start 81mg however he has not taken his first dose yet.     As per wife, patient has an appt with EP tomorrow and already scheduled o/p follow up with Neurologist Dr. Marie Bruno     CTH showed old L occipital lobe infarct No acute findings. CTA neg for acute pathology   LKW 11am.   NIHSS: 0  Baseline mRS: 0   Not a tenecteplase candidate due to low NIH    Impression: Subjective R sided numbness i/s/o recent L LUMA ischemic stroke most likely recrudescence. Low suspicion for new ischemic event     Recommendations:     Imaging/Labs  [x] CTA H/N to look at the cerebral vasculature     Meds  [] ASA81/[id number] for 3 weeks followed by ASA 81 alone indefinitely per CHANCE trial   [] Atorvastatin 80MG QHS, titrate to LDL<70    Other  [] Telemonitoring; Neurochecks and vital signs per unit protocol, Q4H  [] Goal for BP normotension.   [] BG goal <180, avoid hypoglycemia  [] NPO until clears dysphagia screen, otherwise swallow evaluation  [] Fall, aspiration precautions  [] Stroke education provided    Outpatient ENT follow up for 1cm right parotic enhancing lesion     Above mentioned plan was discussed with patient and available family member in detail. All the questions were answered and concerns were addressed.    Patient can follow up with Dr. Danis Shelley at [address] [place] [address] 1-2 weeks after discharge. Please instruct the patient to call [phone] to schedule this appointment.     Case discussed with stroke attending Dr. Shelley     Case to be seen by stroke attending during AM rounds     58y (1965) LEFT handed man with a PMHx significant for R occipital lobe stroke (2009 with slight residual left superior quadrantanopia) thought to be 2/2 paradoxical embolism s/p PFO closure and possibly i/s/o MI, CAD, vtach, recent episode of R sided weakness/numbness 2/2 small L luma stroke currently on aspirin monotherapy presented to the ED for recurrence of R sided numbness.  Patient woke up without weakness or numbness however had sudden onset of RLE >RUE numbness. Patient denies any HA, new weakness, change in speech or gait, facial asymmetry.     Of note, patient stated that he was discharged from [facility] yesterday for similar event. He presented to [facility] on 6/17/24 for R sided weakness/numbness, exam showed slight decrease to LT in R toes and distal sole of foot. W/u included CTA H/N which reportedly normal, TTE with bubble EF 55-60%, no shunt noted. W/u also included hypercoag panel (sent and pending). He was loaded with aspirin 325 yesterday and told to start 81mg however he has not taken his first dose yet.     As per wife, patient has an appt with EP tomorrow and already scheduled o/p follow up with Neurologist Dr. Marie Bruno     CTH showed old L occipital lobe infarct No acute findings. CTA neg for acute pathology   LKW 11am.   NIHSS: 0  Baseline mRS: 0   Not a tenecteplase candidate due to low NIH    Impression: Subjective R sided numbness i/s/o recent L LUMA ischemic stroke most likely recrudescence i/s/o COVID . Low suspicion for new ischemic event    Recommendations:     Imaging/Labs  [x] CTA H/N to look at the cerebral vasculature     Meds  [] ASA81/[id number] for 3 weeks followed by ASA 81 alone indefinitely per CHANCE trial   [] Atorvastatin 80MG QHS, titrate to LDL<70    Other  [] Telemonitoring; Neurochecks and vital signs per unit protocol, Q4H  [] Goal for BP normotension.   [] BG goal <180, avoid hypoglycemia  [] NPO until clears dysphagia screen, otherwise swallow evaluation  [] Fall, aspiration precautions  [] Stroke education provided    Outpatient ENT follow up for 1cm right parotic enhancing lesion     Above mentioned plan was discussed with patient and available family member in detail. All the questions were answered and concerns were addressed.    Patient can follow up with Dr. Danis Shelley at [address] [place] [address] 1-2 weeks after discharge. Please instruct the patient to call [phone] to schedule this appointment.     Case discussed with stroke attending Dr. Shelley     Case to be seen by stroke attending during AM rounds

## 2024-06-20 NOTE — ED ADULT TRIAGE NOTE - CHIEF COMPLAINT QUOTE
57 yo M pt with Embolic stroke Monday with R sided weakness symptoms resolved and returned today after standing c/o loss of balance light headed and R sided weakness starting at 10:45.

## 2024-06-20 NOTE — CONSULT NOTE ADULT - SUBJECTIVE AND OBJECTIVE BOX
Neurology - Consult Note    -  Spectra: [phone] ([facility]), [phone] ([facility])  -    HPI: Patient NICOLE FREDERICK is a 58y (1965) LEFT handed man with a PMHx significant for R occipital lobe stroke (2009 with slight residual left superior quadrantanopia) thought to be 2/2 paradoxical embolism s/p PFO closure and possibly i/s/o MI, CAD, vtach, recent episode of R sided weakness/numbness 2/2 small L luma stroke currently on aspirin monotherapy presented to the ED for recurrence of R sided numbness.  Patient woke up without weakness or numbness however had sudden onset of RLE >RUE numbness. Patient denies any HA, new weakness, change in speech or gait, facial asymmetry.     Of note, patient stated that he was discharged from [facility] yesterday for similar event. He presented to [facility] on 6/17/24 for R sided weakness/numbness, exam showed slight decrease to LT in R toes and distal sole of foot. W/u included CTA H/N which reportedly normal, TTE with bubble EF 55-60%, no shunt noted. W/u also included hypercoag panel (sent and pending). He was loaded with aspirin 325 yesterday and told to start 81mg however he has not taken his first dose yet.     As per wife, patient has an appt with EP tomorrow and already scheduled o/p follow up with Neurologist Dr. Marie Bruno     CTH showed old L occipital lobe infarct No acute findings.   LKW 11am.   NIHSS: 0  Baseline mRS: 0   Not a tenecteplase candidate due to low NIH      Review of Systems:     CONSTITUTIONAL: No fevers or chills  EYES AND ENT: No visual changes or no throat pain   NECK: No pain or stiffness  RESPIRATORY: No hemoptysis or shortness of breath  CARDIOVASCULAR: No chest pain or palpitations  GASTROINTESTINAL: No melena or hematochezia  GENITOURINARY: No dysuria or hematuria  NEUROLOGICAL: +As stated in HPI above  SKIN: No itching, burning, rashes, or lesions   All other review of systems is negative unless indicated above.    Allergies:  No Known Allergies      PMHx/PSHx/Family Hx: As above, otherwise see below   HLD (hyperlipidemia)    TIA (transient ischemic attack)    PFO (patent foramen ovale)    MI (myocardial infarction)        Social Hx:  No reported use of tobacco, alcohol, or illicit drugs    Medications:  MEDICATIONS  (STANDING):    MEDICATIONS  (PRN):        Home Medications:  aspirin 81 mg oral tablet: 1 tab(s) orally once a day (26 Jan 2023 11:59)  Livalo 2 mg oral tablet: 2 tab(s) orally once a day (26 Jan 2023 11:59)      Vitals:  T(C): 36.9 (06-20-24 @ 11:41), Max: 36.9 (06-20-24 @ 11:41)  HR: 79 (06-20-24 @ 11:41) (79 - 79)  BP: 111/83 (06-20-24 @ 11:41) (111/83 - 111/83)  RR: 18 (06-20-24 @ 11:41) (18 - 18)  SpO2: 95% (06-20-24 @ 11:41) (95% - 95%)    Physical Examination:    General - NAD  Cardiovascular - Peripheral pulses palpable, no edema    Neurologic Exam:  Mental status - Awake, Alert, Oriented to person, place, and time. Speech fluent, repetition and naming intact. Follows simple and complex commands. attention, concentration and fund of knowledge intact.     Cranial nerves:  CN II: Visual fields are full to confrontation. Pupils are 3 mm and briskly reactive to light.   CN III, IV, VI: EOMI, no nystagmus, no ptosis  CN V: Facial sensation is intact to pinprick in all 3 divisions bilaterally.  CN VII: Face is symmetric with normal eye closure and smile.  CN VII: Hearing is normal to rubbing fingers  CN IX, X: Palate elevates symmetrically. Phonation is normal.  CN XI: Head turning and shoulder shrug are intact  CN XII: Tongue is midline with normal movements and no atrophy.    Motor - Normal bulk and tone throughout. No pronator drift.  Strength testing            Deltoid      Biceps      Triceps     Wrist Extension    Wrist Flexion       R            5                 5               5                     5                              5                        5  L             5                 5               5                     5                              5                       5              Hip Flexion    Hip Extension    Knee Flexion    Knee Extension    Dorsiflexion    Plantar Flexion  R              5                           5                       5                           5                            5                          5  L              5                           5                        5                           5                            5                          5    neg hoffmans b/l    Sensation - Light touch/temperature intact throughout    DTR's -             Biceps      Triceps     Brachioradialis      Patellar    Ankle    Toes/plantar response  R             2+             2+                  2+                       2+            2+                 Down  L              2+             2+                 2+                        2+           2+                 Down    Coordination - Finger to Nose and heal to shin intact b/l. No tremors appreciated    Gait and station - Normal casual gait. Romberg (-)    Labs:          CAPILLARY BLOOD GLUCOSE      POCT Blood Glucose.: 102 mg/dL (20 Jun 2024 11:41)          CSF:                  Radiology:     Neurology - Consult Note    -  Spectra: [phone] ([facility]), [phone] ([facility])  -    HPI: Patient NICOLE FREDERICK is a 58y (1965) LEFT handed man with a PMHx significant for R occipital lobe stroke (2009 with slight residual left superior quadrantanopia) thought to be 2/2 paradoxical embolism s/p PFO closure and possibly i/s/o MI, CAD, vtach, recent episode of R sided weakness/numbness 2/2 small L luma stroke currently on aspirin monotherapy presented to the ED for recurrence of R sided numbness.  Patient woke up without weakness or numbness however had sudden onset of RLE >RUE numbness. Patient denies any HA, new weakness, change in speech or gait, facial asymmetry.     Of note, patient stated that he was discharged from [facility] yesterday for similar event. He presented to [facility] on 6/17/24 for R sided weakness/numbness, exam showed slight decrease to LT in R toes and distal sole of foot. W/u included CTA H/N which reportedly normal, TTE with bubble EF 55-60%, no shunt noted. W/u also included hypercoag panel (sent and pending). He was loaded with aspirin 325 yesterday and told to start 81mg however he has not taken his first dose yet.     As per wife, patient has an appt with EP tomorrow and already scheduled o/p follow up with Neurologist Dr. Marie Bruno     CTH showed old L occipital lobe infarct No acute findings.   LKW 11am.   NIHSS: 0  Baseline mRS: 0   Not a tenecteplase candidate due to low NIH      Review of Systems:     CONSTITUTIONAL: No fevers or chills  EYES AND ENT: No visual changes or no throat pain   NECK: No pain or stiffness  RESPIRATORY: No hemoptysis or shortness of breath  CARDIOVASCULAR: No chest pain or palpitations  GASTROINTESTINAL: No melena or hematochezia  GENITOURINARY: No dysuria or hematuria  NEUROLOGICAL: +As stated in HPI above  SKIN: No itching, burning, rashes, or lesions   All other review of systems is negative unless indicated above.    Allergies:  No Known Allergies      PMHx/PSHx/Family Hx: As above, otherwise see below   HLD (hyperlipidemia)    TIA (transient ischemic attack)    PFO (patent foramen ovale)    MI (myocardial infarction)        Social Hx:  No reported use of tobacco, alcohol, or illicit drugs    Medications:  MEDICATIONS  (STANDING):    MEDICATIONS  (PRN):        Home Medications:  aspirin 81 mg oral tablet: 1 tab(s) orally once a day (26 Jan 2023 11:59)  Livalo 2 mg oral tablet: 2 tab(s) orally once a day (26 Jan 2023 11:59)      Vitals:  T(C): 36.9 (06-20-24 @ 11:41), Max: 36.9 (06-20-24 @ 11:41)  HR: 79 (06-20-24 @ 11:41) (79 - 79)  BP: 111/83 (06-20-24 @ 11:41) (111/83 - 111/83)  RR: 18 (06-20-24 @ 11:41) (18 - 18)  SpO2: 95% (06-20-24 @ 11:41) (95% - 95%)    Physical Examination:    General - NAD  Cardiovascular - Peripheral pulses palpable, no edema    Neurologic Exam:  Mental status - Awake, Alert, Oriented to person, place, and time. Speech fluent, repetition and naming intact. Follows simple and complex commands. attention, concentration and fund of knowledge intact.     Cranial nerves:  CN II: Visual fields are full to confrontation. Pupils are 3 mm and briskly reactive to light.   CN III, IV, VI: EOMI, no nystagmus, no ptosis  CN V: Facial sensation is intact to pinprick in all 3 divisions bilaterally.  CN VII: Face is symmetric with normal eye closure and smile.  CN VII: Hearing is normal to rubbing fingers  CN IX, X: Palate elevates symmetrically. Phonation is normal.  CN XI: Head turning and shoulder shrug are intact  CN XII: Tongue is midline with normal movements and no atrophy.    Motor - Normal bulk and tone throughout. No pronator drift.  Strength testing            Deltoid      Biceps      Triceps     Wrist Extension    Wrist Flexion       R            5                 5               5                     5                              5                        5  L             5                 5               5                     5                              5                       5              Hip Flexion    Hip Extension    Knee Flexion    Knee Extension    Dorsiflexion    Plantar Flexion  R              5                           5                       5                           5                            5                          5  L              5                           5                        5                           5                            5                          5    neg hoffmans b/l    Sensation - Light touch/temperature intact throughout    DTR's -             Biceps      Triceps     Brachioradialis      Patellar    Ankle    Toes/plantar response  R             2+             2+                  2+                       2+            2+                 Down  L              2+             2+                 2+                        2+           2+                 Down    Coordination - Finger to Nose and heal to shin intact b/l. No tremors appreciated    Gait and station - Normal casual gait. Romberg (-)    Labs:          CAPILLARY BLOOD GLUCOSE      POCT Blood Glucose.: 102 mg/dL (20 Jun 2024 11:41)          CSF:                  Radiology:  < from: CT Angio Neck Stroke Protocol w/ IV Cont (06.20.24 @ 12:30) >  IMPRESSION:  CT angiogram neck:  -No vaso-occlusive disease.    CT angiogram head:  -No vaso-occlusive disease.    CT perfusion:  -No convincing evidence for core infarct orat-risk ischemic tissue   identified. Consider MRI if clinical concern for acute infarct persists.    Other:  -1 cm right parotid enhancing lesion. Differential includes lymph node or   salivary gland neoplasm. Recommend ENT evaluation and targeted ultrasound   for further evaluation.    < end of copied text >     Neurology - Consult Note    -  Spectra: [phone] ([facility]), [phone] ([facility])  -    HPI: Patient NICOLE FREDERICK is a 58y (1965) LEFT handed man with a PMHx significant for R occipital lobe stroke (2009 with slight residual left superior quadrantanopia) thought to be 2/2 paradoxical embolism s/p PFO closure and possibly i/s/o MI, CAD, vtach, recent episode of R sided weakness/numbness 2/2 small L luma stroke currently on aspirin monotherapy presented to the ED for recurrence of R sided numbness.  Patient woke up without weakness or numbness however had sudden onset of RLE >RUE numbness. Patient denies any HA, new weakness, change in speech or gait, facial asymmetry.     Of note, patient stated that he was discharged from [facility] yesterday for similar event. He presented to [facility] on 6/17/24 for R sided weakness/numbness, exam showed slight decrease to LT in R toes and distal sole of foot. W/u included CTA H/N which reportedly normal, TTE with bubble EF 55-60%, no shunt noted. W/u also included hypercoag panel (sent and pending). He was loaded with aspirin 325 yesterday and told to start 81mg however he has not taken his first dose yet.     As per wife, patient has an appt with EP tomorrow and already scheduled o/p follow up with Neurologist Dr. Marie Bruno     CTH showed old L occipital lobe infarct No acute findings.   LKW 11am.   NIHSS: 0  Baseline mRS: 0   Not a tenecteplase candidate due to low NIH    **ED work up positive for COVID      Review of Systems:     CONSTITUTIONAL: No fevers or chills  EYES AND ENT: No visual changes or no throat pain   NECK: No pain or stiffness  RESPIRATORY: No hemoptysis or shortness of breath  CARDIOVASCULAR: No chest pain or palpitations  GASTROINTESTINAL: No melena or hematochezia  GENITOURINARY: No dysuria or hematuria  NEUROLOGICAL: +As stated in HPI above  SKIN: No itching, burning, rashes, or lesions   All other review of systems is negative unless indicated above.    Allergies:  No Known Allergies      PMHx/PSHx/Family Hx: As above, otherwise see below   HLD (hyperlipidemia)    TIA (transient ischemic attack)    PFO (patent foramen ovale)    MI (myocardial infarction)        Social Hx:  No reported use of tobacco, alcohol, or illicit drugs    Medications:  MEDICATIONS  (STANDING):    MEDICATIONS  (PRN):        Home Medications:  aspirin 81 mg oral tablet: 1 tab(s) orally once a day (26 Jan 2023 11:59)  Livalo 2 mg oral tablet: 2 tab(s) orally once a day (26 Jan 2023 11:59)      Vitals:  T(C): 36.9 (06-20-24 @ 11:41), Max: 36.9 (06-20-24 @ 11:41)  HR: 79 (06-20-24 @ 11:41) (79 - 79)  BP: 111/83 (06-20-24 @ 11:41) (111/83 - 111/83)  RR: 18 (06-20-24 @ 11:41) (18 - 18)  SpO2: 95% (06-20-24 @ 11:41) (95% - 95%)    Physical Examination:    General - NAD  Cardiovascular - Peripheral pulses palpable, no edema    Neurologic Exam:  Mental status - Awake, Alert, Oriented to person, place, and time. Speech fluent, repetition and naming intact. Follows simple and complex commands. attention, concentration and fund of knowledge intact.     Cranial nerves:  CN II: Visual fields are full to confrontation. Pupils are 3 mm and briskly reactive to light.   CN III, IV, VI: EOMI, no nystagmus, no ptosis  CN V: Facial sensation is intact to pinprick in all 3 divisions bilaterally.  CN VII: Face is symmetric with normal eye closure and smile.  CN VII: Hearing is normal to rubbing fingers  CN IX, X: Palate elevates symmetrically. Phonation is normal.  CN XI: Head turning and shoulder shrug are intact  CN XII: Tongue is midline with normal movements and no atrophy.    Motor - Normal bulk and tone throughout. No pronator drift.  Strength testing            Deltoid      Biceps      Triceps     Wrist Extension    Wrist Flexion       R            5                 5               5                     5                              5                        5  L             5                 5               5                     5                              5                       5              Hip Flexion    Hip Extension    Knee Flexion    Knee Extension    Dorsiflexion    Plantar Flexion  R              5                           5                       5                           5                            5                          5  L              5                           5                        5                           5                            5                          5    neg hoffmans b/l    Sensation - Light touch/temperature intact throughout    DTR's -             Biceps      Triceps     Brachioradialis      Patellar    Ankle    Toes/plantar response  R             2+             2+                  2+                       2+            2+                 Down  L              2+             2+                 2+                        2+           2+                 Down    Coordination - Finger to Nose and heal to shin intact b/l. No tremors appreciated    Gait and station - Normal casual gait. Romberg (-)    Labs:          CAPILLARY BLOOD GLUCOSE      POCT Blood Glucose.: 102 mg/dL (20 Jun 2024 11:41)          CSF:                  Radiology:  < from: CT Angio Neck Stroke Protocol w/ IV Cont (06.20.24 @ 12:30) >  IMPRESSION:  CT angiogram neck:  -No vaso-occlusive disease.    CT angiogram head:  -No vaso-occlusive disease.    CT perfusion:  -No convincing evidence for core infarct orat-risk ischemic tissue   identified. Consider MRI if clinical concern for acute infarct persists.    Other:  -1 cm right parotid enhancing lesion. Differential includes lymph node or   salivary gland neoplasm. Recommend ENT evaluation and targeted ultrasound   for further evaluation.    < end of copied text >

## 2024-06-20 NOTE — ED PROVIDER NOTE - OBJECTIVE STATEMENT
58Y M PMH CVA, PMI, PFO s/p repair, discharged from [facility] yesterday after a stroke, presenting with weakness. Patient reports that approx 1 hour prior to arrival he started feeling "woozy", with R arm and leg weakness/difficulty moving. Specifically described feeling as though he is underwater. Was able to ambulate but states he had to think about it more than he typically would, didn't feel confident in his movement/walking. No associated headache, chest pain, SOB, abdominal pain, fevers.

## 2024-06-20 NOTE — ED PROVIDER NOTE - NSFOLLOWUPINSTRUCTIONS_ED_ALL_ED_FT
You were seen in the ER today for generalized weakness and numbness.    Your CT scans did not show any signs of an acute stroke today. You tested positive for COVID.    The neurology doctors recommend starting Plavix 75mg daily for the next 3 weeks.     Follow up with your primary doctor and cardiologist in the next week as scheduled.    Return to the ER if you You were seen in the ER today for generalized weakness and numbness.    Your CT scans did not show any signs of an acute stroke today. You tested positive for COVID.    The neurology doctors recommend starting Plavix 75mg daily for the next 3 weeks.     Follow up with your primary doctor and cardiologist in the next week as scheduled.    Return to the ER if you develop new or worsening symptoms including chest pain, difficulty breathing, worsening headaches or dizziness, or if you are otherwise concerned.

## 2024-06-20 NOTE — ED ADULT NURSE NOTE - OBJECTIVE STATEMENT
57 yo M presents to ED A+OX3 c/o R arm tingling. Patient states he was admitted and d/c from a hospital in [place] yesterday after being treated for a stroke. States this morning 1 hour prior to ED arrival he had R side arm and leg tingling that has since resolved. States he felt lightheaded while walking and "like I was under water." Patient denies N/V, weakness, chest pain, SOB, fever, chills. Patient states he feels congested. 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 Speech clear. Ambulatory with steady gait. Pupils equal round and reactive. Equal strength and sensation x 4 extremities. Patient placed on cardiac monitor, NSR on cardiac monitor.

## 2024-06-21 ENCOUNTER — APPOINTMENT (OUTPATIENT)
Dept: CARDIOLOGY | Facility: CLINIC | Age: 59
End: 2024-06-21
Payer: COMMERCIAL

## 2024-06-21 VITALS
SYSTOLIC BLOOD PRESSURE: 110 MMHG | WEIGHT: 290 LBS | BODY MASS INDEX: 37.22 KG/M2 | HEART RATE: 65 BPM | DIASTOLIC BLOOD PRESSURE: 70 MMHG | RESPIRATION RATE: 18 BRPM | HEIGHT: 74 IN | OXYGEN SATURATION: 99 %

## 2024-06-21 DIAGNOSIS — E78.5 HYPERLIPIDEMIA, UNSPECIFIED: ICD-10-CM

## 2024-06-21 DIAGNOSIS — I25.10 ATHEROSCLEROTIC HEART DISEASE OF NATIVE CORONARY ARTERY W/OUT ANGINA PECTORIS: ICD-10-CM

## 2024-06-21 DIAGNOSIS — I47.29 OTHER VENTRICULAR TACHYCARDIA: ICD-10-CM

## 2024-06-21 DIAGNOSIS — I77.810 THORACIC AORTIC ECTASIA: ICD-10-CM

## 2024-06-21 LAB
CULTURE RESULTS: SIGNIFICANT CHANGE UP
SPECIMEN SOURCE: SIGNIFICANT CHANGE UP

## 2024-06-21 PROCEDURE — 99215 OFFICE O/P EST HI 40 MIN: CPT | Mod: 25

## 2024-06-21 PROCEDURE — 93000 ELECTROCARDIOGRAM COMPLETE: CPT | Mod: 59

## 2024-06-21 PROCEDURE — 93242 EXT ECG>48HR<7D RECORDING: CPT

## 2024-06-21 RX ORDER — CLOPIDOGREL 75 MG/1
75 TABLET, FILM COATED ORAL
Refills: 0 | Status: ACTIVE | COMMUNITY

## 2024-06-21 RX ORDER — ASPIRIN 81 MG
81 TABLET, DELAYED RELEASE (ENTERIC COATED) ORAL
Refills: 0 | Status: ACTIVE | COMMUNITY

## 2024-06-21 RX ORDER — UBIDECARENONE 200 MG
CAPSULE ORAL
Refills: 0 | Status: ACTIVE | COMMUNITY

## 2024-06-21 NOTE — ED POST DISCHARGE NOTE - RESULT SUMMARY
spoke with pt who states he is aware of finding, saw his cardiologist today who noted it and he will follow up to monitor nodule. -Tarsha Manley PA-C

## 2024-06-23 NOTE — CARDIOLOGY SUMMARY
[___] : [unfilled] [de-identified] : June 21, 2024.  Normal sinus rhythm.  Incomplete right bundle branch block. [de-identified] : June 18, 2024.  [facility].  Transthoracic echocardiography with intravenous contrast and agitated saline injection.  Normal left ventricular systolic function.  Dilated aortic root 4.6 cm.  Mildly dilated right ventricle.  Normal RV function.  Atrial septum: Agitated saline contrast study at baseline or with provocation shows no right to left atrial  level shunt. [de-identified] : Oct 11, 2022. Coronary calcium score 55.  Minimal CAD.  No moderate or severe CAD idenitified.

## 2024-06-23 NOTE — DISCUSSION/SUMMARY
[EKG obtained to assist in diagnosis and management of assessed problem(s)] : EKG obtained to assist in diagnosis and management of assessed problem(s) [FreeTextEntry1] : 58-year-old man presenting for acute cardiology office visit status post recent hospitalization for acute neurologic symptoms, diagnosed with new CVA, in the setting of recent COVID infection and cardiac arrhyhtmia (non sustained VT)  He has complicated cardiac history of PFO, status post remote closure.  History of nonsustained VT, s/p ILR that is end of service, aortic root enlargement, mild coronary artery disease, hyperlipidemia, aortic root enlargement. On physical examination, blood pressure is low normal.  He is euvolemic.  No new cardiac murmurs rubs or gallops are noted. EKG is sinus rhythm, low voltage.  Incomplete right bundle branch block.  Largely unchanged from prior. Neurology and cardiology imaging, as noted above. Unclear etiology of new onset of CVA.  He also had nonsustained VT in this setting.  Not clear if arrhythmia is playing a role.  However, given prior history of PFO and PFO closure, further evaluation is indicated.  Plan 1.  Reasonable to pursue transesophageal echocardiogram to reassess PFO closure and to rule out peridevice leak or thrombus formation.  Transthoracic echocardiogram done earlier this week did not suggest any left-to-right shunting, but FERNANDO will have a better sensitivity and specificity.  FERNANDO is ordered and will be done in the near future. 2.  Extended Holter monitor is ordered and placed today. 3.  CTA of the aorta is ordered to establish baseline.  Aortic root enlargement noted on echocardiogram. 4.  He will follow-up with electrophysiologist... re implant iLR?  5.  Reviewed incidental finding on CTA done yesterday at [facility] of 1 cm enhancing lesion on the right parotid gland.  Differential diagnosis includes lymph node versus neoplasm.  Radiologist recommending ENT evaluation.  I discussed this with patient and wife, and they will make appointment to see ENT as well. 6.  He will be seeing neurology next week. 7.  Current medications are reviewed, no changes.  He will continue with dual antiplatelet therapy, metoprolol and atorvastatin.  Zepbound was previously ordered and has now just been approved for him.  He plans to defer starting it for now, but will consider it for the future.  8.  The above was reviewed with the patient and with his wife in detail, all questions have been answered to their satisfaction.

## 2024-06-23 NOTE — REASON FOR VISIT
[FreeTextEntry1] : Acute cardiology follow-up office visit status post new diagnosis of CVA.  He has history of nonsustained VT, hyperlipidemia, aortic root enlargement, mild CAD, history of PFO closure and prior CVA.

## 2024-06-24 ENCOUNTER — APPOINTMENT (OUTPATIENT)
Dept: NEUROLOGY | Facility: CLINIC | Age: 59
End: 2024-06-24
Payer: COMMERCIAL

## 2024-06-24 VITALS
DIASTOLIC BLOOD PRESSURE: 80 MMHG | TEMPERATURE: 97 F | OXYGEN SATURATION: 99 % | SYSTOLIC BLOOD PRESSURE: 110 MMHG | BODY MASS INDEX: 37.22 KG/M2 | HEART RATE: 68 BPM | WEIGHT: 290 LBS | HEIGHT: 74 IN

## 2024-06-24 DIAGNOSIS — Q21.12 PATENT FORAMEN OVALE: ICD-10-CM

## 2024-06-24 DIAGNOSIS — I63.9 CEREBRAL INFARCTION, UNSPECIFIED: ICD-10-CM

## 2024-06-24 PROCEDURE — 99205 OFFICE O/P NEW HI 60 MIN: CPT

## 2024-06-24 PROCEDURE — G2211 COMPLEX E/M VISIT ADD ON: CPT

## 2024-06-25 ENCOUNTER — APPOINTMENT (OUTPATIENT)
Dept: CT IMAGING | Facility: HOSPITAL | Age: 59
End: 2024-06-25
Payer: COMMERCIAL

## 2024-06-25 ENCOUNTER — NON-APPOINTMENT (OUTPATIENT)
Age: 59
End: 2024-06-25

## 2024-06-25 ENCOUNTER — OUTPATIENT (OUTPATIENT)
Dept: OUTPATIENT SERVICES | Facility: HOSPITAL | Age: 59
LOS: 1 days | End: 2024-06-25
Payer: COMMERCIAL

## 2024-06-25 DIAGNOSIS — I77.810 THORACIC AORTIC ECTASIA: ICD-10-CM

## 2024-06-25 PROCEDURE — 71275 CT ANGIOGRAPHY CHEST: CPT | Mod: 26

## 2024-06-25 PROCEDURE — 71275 CT ANGIOGRAPHY CHEST: CPT

## 2024-06-26 ENCOUNTER — OUTPATIENT (OUTPATIENT)
Dept: OUTPATIENT SERVICES | Facility: HOSPITAL | Age: 59
LOS: 1 days | End: 2024-06-26
Payer: COMMERCIAL

## 2024-06-26 ENCOUNTER — APPOINTMENT (OUTPATIENT)
Dept: ULTRASOUND IMAGING | Facility: HOSPITAL | Age: 59
End: 2024-06-26
Payer: COMMERCIAL

## 2024-06-26 DIAGNOSIS — I27.82 CHRONIC PULMONARY EMBOLISM: ICD-10-CM

## 2024-06-26 PROCEDURE — 93970 EXTREMITY STUDY: CPT | Mod: 26

## 2024-06-26 PROCEDURE — 93970 EXTREMITY STUDY: CPT

## 2024-06-26 RX ORDER — APIXABAN 5 MG/1
5 TABLET, FILM COATED ORAL
Qty: 90 | Refills: 0 | Status: ACTIVE | COMMUNITY
Start: 2024-06-26 | End: 1900-01-01

## 2024-07-02 ENCOUNTER — APPOINTMENT (OUTPATIENT)
Dept: MRI IMAGING | Facility: HOSPITAL | Age: 59
End: 2024-07-02

## 2024-07-08 ENCOUNTER — APPOINTMENT (OUTPATIENT)
Dept: INTERNAL MEDICINE | Facility: CLINIC | Age: 59
End: 2024-07-08
Payer: COMMERCIAL

## 2024-07-08 VITALS
TEMPERATURE: 97 F | HEIGHT: 74 IN | WEIGHT: 287 LBS | DIASTOLIC BLOOD PRESSURE: 90 MMHG | OXYGEN SATURATION: 99 % | SYSTOLIC BLOOD PRESSURE: 121 MMHG | HEART RATE: 50 BPM | BODY MASS INDEX: 36.83 KG/M2 | RESPIRATION RATE: 16 BRPM

## 2024-07-08 DIAGNOSIS — E66.9 OBESITY, UNSPECIFIED: ICD-10-CM

## 2024-07-08 DIAGNOSIS — I27.82 CHRONIC PULMONARY EMBOLISM: ICD-10-CM

## 2024-07-08 DIAGNOSIS — I63.9 CEREBRAL INFARCTION, UNSPECIFIED: ICD-10-CM

## 2024-07-08 PROCEDURE — 99215 OFFICE O/P EST HI 40 MIN: CPT

## 2024-07-09 ENCOUNTER — NON-APPOINTMENT (OUTPATIENT)
Age: 59
End: 2024-07-09

## 2024-07-10 ENCOUNTER — OUTPATIENT (OUTPATIENT)
Dept: OUTPATIENT SERVICES | Facility: HOSPITAL | Age: 59
LOS: 1 days | End: 2024-07-10
Payer: COMMERCIAL

## 2024-07-10 ENCOUNTER — APPOINTMENT (OUTPATIENT)
Dept: MRI IMAGING | Facility: HOSPITAL | Age: 59
End: 2024-07-10
Payer: COMMERCIAL

## 2024-07-10 DIAGNOSIS — I63.9 CEREBRAL INFARCTION, UNSPECIFIED: ICD-10-CM

## 2024-07-10 PROCEDURE — 70551 MRI BRAIN STEM W/O DYE: CPT

## 2024-07-10 PROCEDURE — 70551 MRI BRAIN STEM W/O DYE: CPT | Mod: 26

## 2024-07-17 ENCOUNTER — NON-APPOINTMENT (OUTPATIENT)
Age: 59
End: 2024-07-17

## 2024-07-17 ENCOUNTER — RESULT REVIEW (OUTPATIENT)
Age: 59
End: 2024-07-17

## 2024-07-17 ENCOUNTER — OUTPATIENT (OUTPATIENT)
Dept: OUTPATIENT SERVICES | Facility: HOSPITAL | Age: 59
LOS: 1 days | End: 2024-07-17
Payer: COMMERCIAL

## 2024-07-17 ENCOUNTER — APPOINTMENT (OUTPATIENT)
Dept: CV DIAGNOSITCS | Facility: HOSPITAL | Age: 59
End: 2024-07-17

## 2024-07-17 VITALS
TEMPERATURE: 98 F | OXYGEN SATURATION: 96 % | SYSTOLIC BLOOD PRESSURE: 120 MMHG | DIASTOLIC BLOOD PRESSURE: 76 MMHG | HEART RATE: 61 BPM | RESPIRATION RATE: 17 BRPM

## 2024-07-17 VITALS
OXYGEN SATURATION: 97 % | SYSTOLIC BLOOD PRESSURE: 121 MMHG | DIASTOLIC BLOOD PRESSURE: 78 MMHG | RESPIRATION RATE: 17 BRPM | HEART RATE: 63 BPM

## 2024-07-17 DIAGNOSIS — Q21.12 PATENT FORAMEN OVALE: ICD-10-CM

## 2024-07-17 PROCEDURE — 93312 ECHO TRANSESOPHAGEAL: CPT

## 2024-07-17 PROCEDURE — 93325 DOPPLER ECHO COLOR FLOW MAPG: CPT | Mod: 26

## 2024-07-17 PROCEDURE — 93320 DOPPLER ECHO COMPLETE: CPT | Mod: 26

## 2024-07-17 PROCEDURE — 93312 ECHO TRANSESOPHAGEAL: CPT | Mod: 26

## 2024-07-17 PROCEDURE — 93320 DOPPLER ECHO COMPLETE: CPT

## 2024-07-17 PROCEDURE — 93325 DOPPLER ECHO COLOR FLOW MAPG: CPT

## 2024-07-17 NOTE — PACU DISCHARGE NOTE - THE ANESTHESIA ORDERS USED IN THE PACU ORDER SET WILL BE DISCONTINUED UPON TRANSFER OF THIS PATIENT
6-25-18 1000  Spoke with daughter Nisreen. Instructions given for Plavix prior to procedure for patient on July3,2018.  Per Dr CRISS Lockhart,ok for patient to hold Plavix beginning June 27, 2018 thru July3, 2018.    Statement Selected

## 2024-07-17 NOTE — PRE-ANESTHESIA EVALUATION ADULT - NSANTHOSAYNRD_GEN_A_CORE
No. MIRI screening performed.  STOP BANG Legend: 0-2 = LOW Risk; 3-4 = INTERMEDIATE Risk; 5-8 = HIGH Risk

## 2024-07-23 ENCOUNTER — OUTPATIENT (OUTPATIENT)
Dept: OUTPATIENT SERVICES | Facility: HOSPITAL | Age: 59
LOS: 1 days | Discharge: HOME | End: 2024-07-23

## 2024-07-23 DIAGNOSIS — I26.09 OTHER PULMONARY EMBOLISM WITH ACUTE COR PULMONALE: ICD-10-CM

## 2024-07-29 ENCOUNTER — APPOINTMENT (OUTPATIENT)
Dept: HEMATOLOGY ONCOLOGY | Facility: CLINIC | Age: 59
End: 2024-07-29

## 2024-07-29 VITALS
SYSTOLIC BLOOD PRESSURE: 108 MMHG | TEMPERATURE: 98.5 F | RESPIRATION RATE: 16 BRPM | DIASTOLIC BLOOD PRESSURE: 73 MMHG | OXYGEN SATURATION: 95 % | HEIGHT: 73.9 IN | WEIGHT: 291.45 LBS | BODY MASS INDEX: 37.4 KG/M2 | HEART RATE: 54 BPM

## 2024-07-29 DIAGNOSIS — R06.09 OTHER FORMS OF DYSPNEA: ICD-10-CM

## 2024-07-29 DIAGNOSIS — Z87.898 PERSONAL HISTORY OF OTHER SPECIFIED CONDITIONS: ICD-10-CM

## 2024-07-29 DIAGNOSIS — I27.82 CHRONIC PULMONARY EMBOLISM: ICD-10-CM

## 2024-07-29 PROCEDURE — 99244 OFF/OP CNSLTJ NEW/EST MOD 40: CPT

## 2024-07-29 NOTE — PHYSICAL EXAM
All other review of systems negative, except as noted in HPI [Normal] : full range of motion and no deformities appreciated [de-identified] : RRR, normal S1S2 [de-identified] : no pitting edema [de-identified] : no rash [de-identified] : A & O x 4

## 2024-07-29 NOTE — REVIEW OF SYSTEMS
[Vision Problems] : vision problems [Fever] : no fever [Chills] : no chills [Night Sweats] : no night sweats [Fatigue] : no fatigue [Mucosal Pain] : no mucosal pain [Chest Pain] : no chest pain [Lower Ext Edema] : no lower extremity edema [Shortness Of Breath] : no shortness of breath [Cough] : no cough [Abdominal Pain] : no abdominal pain [Vomiting] : no vomiting [Joint Pain] : no joint pain [Muscle Pain] : no muscle pain [Skin Rash] : no skin rash [Dizziness] : no dizziness [Fainting] : no fainting [Easy Bleeding] : no tendency for easy bleeding [Easy Bruising] : no tendency for easy bruising [FreeTextEntry7] : no diarrhea [FreeTextEntry9] : no bone pain [de-identified] : no paresthesia

## 2024-07-29 NOTE — HISTORY OF PRESENT ILLNESS
[de-identified] :  58-year-old male presents with his wife. In 2009 he suffered a right occipital infarct s/p cardiac cath 24 hrs prior. He saw Optho and sent to Neurology with MRI Brain ordered. He saw Cardio for followup. He had outpatient FERNANDO which showed PFO.  He underwent PFO closure at [facility] in 4/2009. He did well and had a TIA in 2016. He was reevaluated at [facility] which confirmed PFO closure. He has been left with a small left superior quadrant visual field infarct post the 2009 CVA.  He discontinued his aspirin approximately late April 2024.  On 6/17/2024 after walking down several flights of stairs following a fire alarm at work he developed left hemiparesis maximal involvement of the left foot. He was admitted to [facility] for 2 nights and found to have an acute left parietal lobe small infarct on an MRI dated 6/18/2024. His symptomatology has completely resolved other than a mild tingling involving toes of the right foot. CT angiogram head and neck was negative. There was no evidence of atrial fibrillation. There was an old right occipital infarct on the MRI. Echocardiogram did not reveal PFO. He was discharged on aspirin and he received statin medication and metoprolol.  Subsequently on 6/20/2024 he was evaluated in the emergency room at [facility] due to onset of dizziness near syncope and transient recurrence of right-sided numbness and weakness. At [facility] emergency room CT angiogram of the brain and neck and CT of the brain were all unremarkable. EKG did not reveal atrial fibrillation. Aspirin was continued and he was discharged on Plavix 75 mg once daily for 3 weeks, atorvastatin, metoprolol. He has had a cough and other symptomatology and he did test positive for COVID in the [facility] ER.  He has an ambulatory heart monitor in place. He is scheduled to have FERNANDO. Hypercoagulable workup performed at [facility] is negative, I reviewed results on patient's hien- See result section.  (7/17/24) FERNANDO- Findings are consistent with a PFO with bidirectional shunting, there is a closure device but it is not occluding the PFO.  Family Medical History- unknown if any blood clots or TIA, CVA

## 2024-07-30 ENCOUNTER — NON-APPOINTMENT (OUTPATIENT)
Age: 59
End: 2024-07-30

## 2024-07-30 ENCOUNTER — APPOINTMENT (OUTPATIENT)
Dept: ELECTROPHYSIOLOGY | Facility: CLINIC | Age: 59
End: 2024-07-30
Payer: COMMERCIAL

## 2024-07-30 ENCOUNTER — APPOINTMENT (OUTPATIENT)
Dept: NEUROLOGY | Facility: CLINIC | Age: 59
End: 2024-07-30

## 2024-07-30 VITALS
OXYGEN SATURATION: 95 % | BODY MASS INDEX: 37.33 KG/M2 | WEIGHT: 290 LBS | SYSTOLIC BLOOD PRESSURE: 115 MMHG | HEART RATE: 62 BPM | DIASTOLIC BLOOD PRESSURE: 80 MMHG

## 2024-07-30 DIAGNOSIS — I47.29 OTHER VENTRICULAR TACHYCARDIA: ICD-10-CM

## 2024-07-30 DIAGNOSIS — I47.19 OTHER SUPRAVENTRICULAR TACHYCARDIA: ICD-10-CM

## 2024-07-30 PROCEDURE — 93000 ELECTROCARDIOGRAM COMPLETE: CPT

## 2024-07-30 PROCEDURE — 99215 OFFICE O/P EST HI 40 MIN: CPT | Mod: 25

## 2024-07-31 ENCOUNTER — RX RENEWAL (OUTPATIENT)
Age: 59
End: 2024-07-31

## 2024-07-31 NOTE — CARDIOLOGY SUMMARY
[de-identified] : today; Sinus Rhythm  -Incomplete right bundle branch block. [de-identified] : 6/21-6/25/2024 1.93% VE of 2 forms.  NSVT [de-identified] : 7/17/2024  1. Moderately enlarged right ventricular cavity size.  2. Dilated coronary sinus.  3. No evidence of left atrial or left atrial appendage thrombus.  4. There is a patent mckinnon ovale as described which demonstrates bidirectional shunting. There is an atrial septal/patent mckinnon ovale closure device in place. The patent mckinnon ovale is not occluded by the device. Correlation to patient procedural history and clinical correlation is recommended.  5. No prior FERNANDO available for comparison. [de-identified] : 6/26/2024 Bilateral segmental pulmonary emboli in the right upper, right lower, and left lower pulmonary artery branches. No CT evidence of right heart strain.  Stable enlarged aortic root measuring 4.2 cm at the level of the sinuses of Valsalva.

## 2024-07-31 NOTE — CARDIOLOGY SUMMARY
[de-identified] : today; Sinus Rhythm  -Incomplete right bundle branch block. [de-identified] : 6/21-6/25/2024 1.93% VE of 2 forms.  NSVT [de-identified] : 7/17/2024  1. Moderately enlarged right ventricular cavity size.  2. Dilated coronary sinus.  3. No evidence of left atrial or left atrial appendage thrombus.  4. There is a patent mckinnon ovale as described which demonstrates bidirectional shunting. There is an atrial septal/patent mckinnon ovale closure device in place. The patent mckinnon ovale is not occluded by the device. Correlation to patient procedural history and clinical correlation is recommended.  5. No prior FERNANDO available for comparison. [de-identified] : 6/26/2024 Bilateral segmental pulmonary emboli in the right upper, right lower, and left lower pulmonary artery branches. No CT evidence of right heart strain.  Stable enlarged aortic root measuring 4.2 cm at the level of the sinuses of Valsalva.

## 2024-07-31 NOTE — HISTORY OF PRESENT ILLNESS
[FreeTextEntry1] : 58 year old man presents for electrophysiology evaluation. Earlier this Summer he was admitted to [facility] in [place]. He was in his usual state of good health, and at work there was a fire alarm. The whole buildings being evacuated and he was walking down stairs from the 6 floor. skilled nursing down, he said had a sudden feeling of right-sided weakness and numbness. He felt that he may pass out, near syncope. He sat down on the stairs and bystanders helped him. EMS was activated. He never had loss of consciousness. He had persistent feeling of right-sided weakness but by the time EMS arrived, he felt that the weakness had resolved. EMS noted an episode of nonsustained wide-complex tachycardia. From the ER, he was admitted, spent 2 nights in the hospital. Extensive workup was done including cardiology consultation, neurology evaluation. Brain imaging that included MRI that demonstrated a new CVA. Echocardiogram demonstrated normal LV function, dilated aortic root 4.6 cm, reported negative bubble study. He was subsequently discharged home.  After being home for about a day, he again had a feeling of "wobbliness", right-sided tingling, denied any right-sided weakness. With this episode, he went to the [facility] ER. Code stroke was called, brain imaging done, no new CVA was identified. Neurology consultation was done in the ED, and the feeling was that this was a recrudescence of recent neurologic event. Advised to add clopidogrel 75 mg daily for the next 3 weeks to his medical regimen. In retrospect, he felt that he was getting "rundown" and was having an intermittent cough and asked that the ED staff check him for COVID. He returned COVID-positive. After several hours of observation he was discharged home.  Since being home yesterday, he feels back to his baseline.

## 2024-07-31 NOTE — DISCUSSION/SUMMARY
[FreeTextEntry1] : 58 year old man with recurrent neurologic event.  With his persistent itraatrial communication and DVT it seems most likely that this is the etiology.  However he is still at risk for atrial arrhythmia and making a diagnosis does have implications.  I did  him that if the plan is to continue oral AC because of the DVT, the diagnosis of PAF has less implications.  Nonetheless he is very anxious and favors this type of monitoring. He highlights his prior experience with the loop and that he perceives little risk.    All of his questions were answerea nd ILR implantation will be arranged.

## 2024-07-31 NOTE — HISTORY OF PRESENT ILLNESS
[FreeTextEntry1] : 58 year old man presents for electrophysiology evaluation. Earlier this Summer he was admitted to [facility] in [place]. He was in his usual state of good health, and at work there was a fire alarm. The whole buildings being evacuated and he was walking down stairs from the 6 floor. intermediate down, he said had a sudden feeling of right-sided weakness and numbness. He felt that he may pass out, near syncope. He sat down on the stairs and bystanders helped him. EMS was activated. He never had loss of consciousness. He had persistent feeling of right-sided weakness but by the time EMS arrived, he felt that the weakness had resolved. EMS noted an episode of nonsustained wide-complex tachycardia. From the ER, he was admitted, spent 2 nights in the hospital. Extensive workup was done including cardiology consultation, neurology evaluation. Brain imaging that included MRI that demonstrated a new CVA. Echocardiogram demonstrated normal LV function, dilated aortic root 4.6 cm, reported negative bubble study. He was subsequently discharged home.  After being home for about a day, he again had a feeling of "wobbliness", right-sided tingling, denied any right-sided weakness. With this episode, he went to the [facility] ER. Code stroke was called, brain imaging done, no new CVA was identified. Neurology consultation was done in the ED, and the feeling was that this was a recrudescence of recent neurologic event. Advised to add clopidogrel 75 mg daily for the next 3 weeks to his medical regimen. In retrospect, he felt that he was getting "rundown" and was having an intermittent cough and asked that the ED staff check him for COVID. He returned COVID-positive. After several hours of observation he was discharged home.  Since being home yesterday, he feels back to his baseline.

## 2024-07-31 NOTE — CARDIOLOGY SUMMARY
[de-identified] : today; Sinus Rhythm  -Incomplete right bundle branch block. [de-identified] : 6/21-6/25/2024 1.93% VE of 2 forms.  NSVT [de-identified] : 7/17/2024  1. Moderately enlarged right ventricular cavity size.  2. Dilated coronary sinus.  3. No evidence of left atrial or left atrial appendage thrombus.  4. There is a patent mckinnon ovale as described which demonstrates bidirectional shunting. There is an atrial septal/patent mckinnon ovale closure device in place. The patent mckinnon ovale is not occluded by the device. Correlation to patient procedural history and clinical correlation is recommended.  5. No prior FERNANDO available for comparison. [de-identified] : 6/26/2024 Bilateral segmental pulmonary emboli in the right upper, right lower, and left lower pulmonary artery branches. No CT evidence of right heart strain.  Stable enlarged aortic root measuring 4.2 cm at the level of the sinuses of Valsalva.

## 2024-07-31 NOTE — HISTORY OF PRESENT ILLNESS
[FreeTextEntry1] : 58 year old man presents for electrophysiology evaluation. Earlier this Summer he was admitted to [facility] in [place]. He was in his usual state of good health, and at work there was a fire alarm. The whole buildings being evacuated and he was walking down stairs from the 6 floor. group home down, he said had a sudden feeling of right-sided weakness and numbness. He felt that he may pass out, near syncope. He sat down on the stairs and bystanders helped him. EMS was activated. He never had loss of consciousness. He had persistent feeling of right-sided weakness but by the time EMS arrived, he felt that the weakness had resolved. EMS noted an episode of nonsustained wide-complex tachycardia. From the ER, he was admitted, spent 2 nights in the hospital. Extensive workup was done including cardiology consultation, neurology evaluation. Brain imaging that included MRI that demonstrated a new CVA. Echocardiogram demonstrated normal LV function, dilated aortic root 4.6 cm, reported negative bubble study. He was subsequently discharged home.  After being home for about a day, he again had a feeling of "wobbliness", right-sided tingling, denied any right-sided weakness. With this episode, he went to the [facility] ER. Code stroke was called, brain imaging done, no new CVA was identified. Neurology consultation was done in the ED, and the feeling was that this was a recrudescence of recent neurologic event. Advised to add clopidogrel 75 mg daily for the next 3 weeks to his medical regimen. In retrospect, he felt that he was getting "rundown" and was having an intermittent cough and asked that the ED staff check him for COVID. He returned COVID-positive. After several hours of observation he was discharged home.  Since being home yesterday, he feels back to his baseline.

## 2024-08-06 ENCOUNTER — RX RENEWAL (OUTPATIENT)
Age: 59
End: 2024-08-06

## 2024-08-06 ENCOUNTER — APPOINTMENT (OUTPATIENT)
Dept: NEUROLOGY | Facility: CLINIC | Age: 59
End: 2024-08-06

## 2024-08-06 PROCEDURE — G2211 COMPLEX E/M VISIT ADD ON: CPT

## 2024-08-06 PROCEDURE — 99214 OFFICE O/P EST MOD 30 MIN: CPT

## 2024-08-06 NOTE — ASSESSMENT
[FreeTextEntry1] : Impression: This 58-year-old male patient status post right occipital infarct 209 in the setting of PFO status post closure.  Status post TIA 10/24/2016.  Status post left parietal infarct 6/17/2024.  Status post TIA 6/20/2024.  Recent FERNANDO confirms that indeed the PFO is no longer occluded by the closure device.  There was no evidence of atrial fibrillation.  Today's neurological exam is baseline and has had no recurrent episodes since 6/20/2024.  Recommendations: Agree with Eliquis and aspirin pending reevaluation to have the PFO again closed.  Neurological follow-up in 6 months or as needed.

## 2024-08-06 NOTE — PHYSICAL EXAM
[FreeTextEntry1] : Head:  Normocephalic Neck: Supple nontender no carotid bruits.  Mental Status:  Alert Oriented X3 Speech normal and no aphasia or dysarthria.  Cranial Nerves:  PERRL, left superior quadrant visual field defect is chronic.  Full  EOMI no diplopia no ptosis no nystagmus, V through XII intact.  Motor:  No drift, normal strength tone and coordination and no focal atrophy. No abnormal movements. No dysmetria.  Normal rapid alternating movements.   DTRs: Symmetric and 2+.  Plantars flexor.  No Clonus.  Sensory:  Normal testing with pin light touch.  Gait: Regular.

## 2024-08-06 NOTE — HISTORY OF PRESENT ILLNESS
[FreeTextEntry1] : This patient is seen for an office visit.  He was last seen by me on 6/24/2024 status post right occipital infarct 2009 and PFO closure at [facility].  He had TIA in 2016 and evaluation confirmed closure of the PFO.  On 6/17/2024 he had a left hemiparesis and was diagnosed with a small infarct on MRI from 6/18/2024 symptoms lately resolved.  He was sent home on aspirin and statin medication and metoprolol.  On 6/20/2024 he was seen in the [facility] emergency room for dizziness and near syncope and transient right sided numbness and weakness.  There was no evidence of atrial fibrillation.  He was told to take Plavix 75 mg for 3 weeks.  MRI of the brain on 7/10/2024 revealed some white matter changes considered nonspecific and old right occipital infarct but no acute findings.  Chest CTA revealed findings consistent with pulmonary emboli and enlarged aortic root which was stable.  At that time he was started on Eliquis.  Venous Doppler was negative for DVT.  Subsequently FERNANDO revealed that the PFO was no longer occluded by the closure device.  He is scheduled to be evaluated at [facility] in this regard and remains on aspirin and Eliquis.  Plavix has been stopped.  He denies recurrent symptoms to suggest further TIA/CVA.  Hypercoagulable workup was negative.

## 2024-08-07 ENCOUNTER — APPOINTMENT (OUTPATIENT)
Dept: OTOLARYNGOLOGY | Facility: CLINIC | Age: 59
End: 2024-08-07

## 2024-08-07 PROBLEM — K11.8 PAROTID NODULE: Status: ACTIVE | Noted: 2024-08-07

## 2024-08-07 PROCEDURE — 99243 OFF/OP CNSLTJ NEW/EST LOW 30: CPT

## 2024-08-07 NOTE — HISTORY OF PRESENT ILLNESS
[de-identified] : 59 yo male who presents for an evaluation on parotid gland. On 6/17/2024 he had  small stroke, and after work up there was an incidental finding on imaging.  6/20/2024 CT head showing  1 cm right parotid enhancing lesion. Differential includes lymph node or salivary gland neoplasm. Denies pain, dysphagia,  odynophagia, dysphonia and or dyspnea. No otalgia or trismus.  Residual from stroke only right side numb.  Denies hx of skin cancer.  Eliquis 5 BID and ASA 81mg.  Former smoker, smoked about 1 ppd in his teen years and early twenties. Occasional 3.-5 alcohol drinks  a week.

## 2024-08-07 NOTE — PHYSICAL EXAM
[de-identified] : no pap lesikon [Normal] : mucosa is normal [Midline] : trachea located in midline position

## 2024-08-07 NOTE — CONSULT LETTER
Subjective:       Patient ID: Silvia Cervantes is a 54 y.o. female.    Chief Complaint:     HPI 53 yo F with PMH of HTN, abnormal pap (2013 but recent negative, no cancer), RA (+CCP, RF),erosive here for evaluation. She was diagnosed in 2011 with hand and feet with swelling and pain. She was initially treated with MTX but it gave her nausea which gave her nausea.  She was changed to leflunomide 20 mg 10/2014 from mtx due to nausea on the mtx. Then I added etanercept January 2015 due to incomplete control of her arthritis on leflunomide.  No am stiffness.  She reports mild aching in hands (3/10). Pain is aching.  Reports mild swelling in hands but better than usual.  She reports left upper quadrant pain (4/10) , non-radiating with possible nausea which has been present for months. Sometimes she has sour taste in mouth.       Interval history:  Patient is here for follow up.   Patient is s/p right rheumatoid nodule excision elbow and wrist, extensor tenosynovectomy and osteophyte excision ulna 6/30/22.   She reports she has been cleared to restart the enbrel. She has not taken enbrel since June 21.   Pain level is 5/10 in hands. Reports some improvement in strength in her hands.  Denies history of diverticulitis, PUD, or blood clot.         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   HENT: Negative for congestion, ear discharge, ear pain, facial swelling, mouth sores, sinus pressure, sneezing, sore throat, tinnitus and trouble swallowing.    Eyes: Negative for photophobia, pain, discharge, redness, itching and visual disturbance.  [Dear  ___] : Dear  [unfilled],   Respiratory: Negative for apnea, chest tightness, shortness of breath, wheezing and stridor.    Cardiovascular: Negative for leg swelling.   Gastrointestinal: Negative for nausea, abdominal pain, diarrhea, constipation, blood in stool, abdominal distention and anal bleeding.   Endocrine: Negative for cold intolerance and heat intolerance.   Genitourinary: Negative for dysuria and difficulty urinating.   Musculoskeletal: Positive for arthralgias. Negative for myalgias, back pain, joint swelling, gait problem, neck pain and neck stiffness.   Skin: Negative for color change, pallor, rash and wound.   Neurological: Negative for dizziness, seizures, light-headedness and numbness.   Hematological: Negative for adenopathy. Does not bruise/bleed easily.   Psychiatric/Behavioral: Negative for sleep disturbance. The patient is not nervous/anxious.       Objective:        Physical Exam   Constitutional: She is oriented to person, place, and time.   HENT:   Head: Normocephalic and atraumatic.   Right Ear: External ear normal.   Left Ear: External ear normal.   Nose: Nose normal.   Mouth/Throat: Oropharynx is clear and moist. No oropharyngeal exudate.   Eyes: Conjunctivae and EOM are normal. Pupils are equal, round, and reactive to light. Right eye exhibits no discharge. Left eye exhibits no discharge. No scleral icterus.   Neck: Neck supple. No JVD present. No thyromegaly present.   Cardiovascular: Normal rate, regular rhythm, normal heart sounds and intact distal pulses.  Exam reveals no gallop and no friction rub.    No murmur heard.  Pulmonary/Chest: Effort normal and breath sounds normal. No respiratory distress. She has no wheezes. She has no rales. She exhibits no tenderness.   Abdominal: Soft. Bowel sounds are normal. She exhibits no distension and no mass. There is no tenderness. There is no rebound and no guarding.   Lymphadenopathy:     She has no cervical adenopathy.   Neurological: She is alert and oriented to  [Consult Letter:] : I had the pleasure of evaluating your patient, [unfilled]. "person, place, and time. No cranial nerve deficit. Gait normal. Coordination normal.   Skin:diffuse hypopigmented lesions in arms, hands, legs, chest, back   Psychiatric: Affect and judgment normal.   Musculoskeletal: She exhibits no  tenderness. She exhibits no edema.   FROM of all joints including neck, shoulders, spine, ankles, wrists, knees, and ankles; no joint deformities noted or effusions, erythema or warmth; no tophi or nodules noted; no crepitus; no nail pitting or onycholysis          Labs:  Esr-25  ccp-93  rf-36  Flor-negative    Imaging:    MRI brain (2021):   Impression:     Scattered areas of prior hemorrhage including parenchymal, leptomeningeal and intraventricular involvement.  Moderate supratentorial leukoencephalopathy.  Small focus of right anterior temporal encephalomalacia.  Although not entirely specific, the constellation of findings is most suggestive of prior traumatic brain injury/diffuse axonal injury.  Clinical correlation required.     No evidence of new hemorrhage or major vascular distribution infarct.  MRI (7/22/2015)   (Stable enhancing marginal erosions in the second and third metacarpal heads, lunate, triquetrum, and capitate)      Right elbow xray (2020): I personally reviewed      dexa scan:(2017):  Osteopenia of the femoral neck. FRAX calculation does not support treatment for osteoporosis.Total hip and lumbar spine BMD unchanged since 2013.    Recommendations:  1) Adequate calcium and Vitamin D therapy  2) Appropriate exercise  3) Consider repeat BMD in 2- 4 years          Assessment:     60 yo F with PMH of HTN, abnormal pap (2013 but recent negative, no cancer), RA (+CCP, RF),erosive here , [facility] for follow up of seropositive RA and sarcoid.   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  She has abnormal brain MRI  AMYLOID ANGIOPATHY and IS SEEING NEURO.  She had worsening of nodules in " [Please see my note below.] : Please see my note below. [Consult Closing:] : Thank you very much for allowing me to participate in the care of this patient.  If you have any questions, please do not hesitate to contact me. right elbow and hair thinning, so  MTX was stopped.    Patient is s/p right rheumatoid nodule excision elbow and wrist, extensor tenosynovectomy and osteophyte excision ulna 6/30/22.       She is doing well on enbrel . She self stopped arava.  We can consider putting her on arava or SSZ  At next visit if needed            -restart enbrel 50mg sub q once a week  (Risks of TNF inhibitor discussed with patient and not limited to cell count abnormalities, malignancy, allergic  reaction to medication, and increased risk of infection. Patient agrees with starting medication.)   -Continue folic acid 4 mg a day  Labs in 3 months and follow up in 6 months    30 * minutes of total time spent on the encounter, which includes face to face time and non-face to face time preparing to see the patient (eg, review of tests), Obtaining and/or reviewing separately obtained history, Documenting clinical information in the electronic or other health record, Independently interpreting results (not separately reported) and communicating results to the patient/family/caregiver, or Care coordination (not separately reported).                [Sincerely,] : Sincerely, [FreeTextEntry2] : Bogdan Stevenson MD  [FreeTextEntry3] : Petr Jack MD, FACS     [facility] Associate Chair    Department of Otolaryngology  Professor Otolaryngology & Molecular Medicine [facility]

## 2024-08-08 ENCOUNTER — APPOINTMENT (OUTPATIENT)
Dept: CARDIOLOGY | Facility: CLINIC | Age: 59
End: 2024-08-08

## 2024-08-08 ENCOUNTER — NON-APPOINTMENT (OUTPATIENT)
Age: 59
End: 2024-08-08

## 2024-08-08 PROCEDURE — G2211 COMPLEX E/M VISIT ADD ON: CPT

## 2024-08-08 PROCEDURE — 99205 OFFICE O/P NEW HI 60 MIN: CPT

## 2024-08-08 PROCEDURE — 93000 ELECTROCARDIOGRAM COMPLETE: CPT

## 2024-08-11 NOTE — HISTORY OF PRESENT ILLNESS
[FreeTextEntry1] : History of Present Illness 58 year old man with past medical history significant for  Right occipital infarction 2009 status post PFO closure at [place] TIA 2016 evaluation confirm closure of the PFO  Earlier this Summer, 6/17/2024 [facility] was [facility] [place]. He was in his usual state of good health, and at work there was a fire alarm. The whole buildings being evacuated and he was walking down stairs from the 6 floor. [place] down, he said had a sudden feeling of right-sided weakness and numbness. He felt that he may pass out, near syncope. He sat down on the stairs and bystanders helped him. EMS was activated. He never had loss of consciousness. He had persistent feeling of right-sided weakness but by the time EMS arrived, he felt that the weakness had resolved. EMS noted an episode of nonsustained wide-complex tachycardia. From the ER, he was admitted, spent 2 nights in the hospital. Extensive workup was done including cardiology consultation, neurology evaluation. Brain imaging that included MRI that demonstrated a new CVA.  MRI performed on 6/18/2024.  Echocardiogram demonstrated normal LV function, dilated aortic root 4.6 cm, reported negative bubble study. He was subsequently discharged home.  MRI of the brain on 7/10/2024 revealed some white matter changes considered nonspecific and old right occipital infarct but no acute findings.   Chest CTA revealed findings consistent with pulmonary emboli and enlarged aortic root which was stable. At that time he was started on Eliquis. Venous Doppler was negative for DVT.  After being home for about a day, he again had a feeling of "wobbliness", right-sided tingling, denied any right-sided weakness. With this episode, he went to the [facility] ER. Code stroke was called, brain imaging done, no new CVA was identified. Neurology consultation was done in the ED, and the feeling was that this was a recrudescence of recent neurologic event. Advised to add clopidogrel 75 mg daily for the next 3 weeks to his medical regimen. In retrospect, he felt that he was getting "rundown" and was having an intermittent cough and asked that the ED staff check him for COVID. He returned COVID-positive. After several hours of observation he was discharged home.  The patient underwent a FERNANDO on 7/17/2024 that demonstrated moderately enlarged right ventricular cavity size.  Dilated coronary sinus.  No evidence of left atrial or left atrial appendage thrombus.  There is a patent foramen ovale as described which demonstrates bidirectional shunting.  There is an atrial septal/patent foramen ovale closure device in place.  The patent foramen ovale is not included by the device.  The ASD/PFO closure device does not occlude the PFO and appears deployed anterior and caudal to the area shunting.  Venous duplex study on 6/26 of 2024 demonstrate no evidence of DVT in either lower extremity.  Holter monitoring was performed which demonstrated underlying rhythm of sinus rhythm with first-degree AV block.  Atrial fibrillation/flutter burden 0%.  Pauses greater than 2.5 seconds 0.  No second or third-degree AV block.  Occasional PACs and PVCs.  Hypercarbia workup was negative.  He is on aspirin and Eliquis.  Plavix has been stopped.  Assessment/plan PFO Stroke NSVT Hyperlipidemia Mild coronary disease Aortic root dilatation  -- Patient has been seen by multidisciplinary team including neurology, cardiology and electrophysiology.  Was noted to have recurrent stroke and found to have bidirectional shunting of PFO that was not closed initially on FERNANDO.  Negative Holter monitor.  Negative hypercoagulable workup.   --Discussed with patient and his wife indications for PFO closure under FERNANDO guidance and general anesthesia.  Indications for the procedure reviewed.  Benefits and risks of the procedure were gone over.  Risks include but are not limited to infection, bleeding, arrhythmia, TIA/stroke, hemodynamically, vascular tree, need for urgent surgery and death. --Following device closure he will need to be on Eliquis 5mg PO BID for at least 3 months.  Continue aspirin 81mg daily.  Eliquis will need to be held for 48 hours prior to the procedure. --He was informed that the PFO occluder device is compatabile with 1.5 and 3T magnets.  He will need to receive preprocedural antibiotics for the first 6 months following device closure. --Continue metoprolol succinate ER 50mg daily. --Continue pitavastatin 4mg daily. --EKG performed due to history of PFO/stroke.  All questions and concerns of the patient and his wife were addressed.  Findings discussed with cardiology/Dr. Del Toro.

## 2024-08-11 NOTE — HISTORY OF PRESENT ILLNESS
[FreeTextEntry1] : History of Present Illness 58 year old man with past medical history significant for  Right occipital infarction 2009 status post PFO closure at [place] TIA 2016 evaluation confirm closure of the PFO  Earlier this Summer, 6/17/2024 he was admitted to [facility] in [place]. He was in his usual state of good health, and at work there was a fire alarm. The whole buildings being evacuated and he was walking down stairs from the 6 floor. [place] down, he said had a sudden feeling of right-sided weakness and numbness. He felt that he may pass out, near syncope. He sat down on the stairs and bystanders helped him. EMS was activated. He never had loss of consciousness. He had persistent feeling of right-sided weakness but by the time EMS arrived, he felt that the weakness had resolved. EMS noted an episode of nonsustained wide-complex tachycardia. From the ER, he was admitted, spent 2 nights in the hospital. Extensive workup was done including cardiology consultation, neurology evaluation. Brain imaging that included MRI that demonstrated a new CVA.  MRI performed on 6/18/2024.  Echocardiogram demonstrated normal LV function, dilated aortic root 4.6 cm, reported negative bubble study. He was subsequently discharged home.  MRI of the brain on 7/10/2024 revealed some white matter changes considered nonspecific and old right occipital infarct but no acute findings.   Chest CTA revealed findings consistent with pulmonary emboli and enlarged aortic root which was stable. At that time he was started on Eliquis. Venous Doppler was negative for DVT.  After being home for about a day, he again had a feeling of "wobbliness", right-sided tingling, denied any right-sided weakness. With this episode, he went to the [facility] ER. Code stroke was called, brain imaging done, no new CVA was identified. Neurology consultation was done in the ED, and the feeling was that this was a recrudescence of recent neurologic event. Advised to add clopidogrel 75 mg daily for the next 3 weeks to his medical regimen. In retrospect, he felt that he was getting "rundown" and was having an intermittent cough and asked that the ED staff check him for COVID. He returned COVID-positive. After several hours of observation he was discharged home.  The patient underwent a FERNANDO on 7/17/2024 that demonstrated moderately enlarged right ventricular cavity size.  Dilated coronary sinus.  No evidence of left atrial or left atrial appendage thrombus.  There is a patent foramen ovale as described which demonstrates bidirectional shunting.  There is an atrial septal/patent foramen ovale closure device in place.  The patent foramen ovale is not included by the device.  The ASD/PFO closure device does not occlude the PFO and appears deployed anterior and caudal to the area shunting.  Venous duplex study on 6/26 of 2024 demonstrate no evidence of DVT in either lower extremity.  Holter monitoring was performed which demonstrated underlying rhythm of sinus rhythm with first-degree AV block.  Atrial fibrillation/flutter burden 0%.  Pauses greater than 2.5 seconds 0.  No second or third-degree AV block.  Occasional PACs and PVCs.  Hypercarbia workup was negative.  He is on aspirin and Eliquis.  Plavix has been stopped.  Assessment/plan PFO Stroke NSVT Hyperlipidemia Mild coronary disease Aortic root dilatation  -- Patient has been seen by multidisciplinary team including neurology, cardiology and electrophysiology.  Was noted to have recurrent stroke and found to have bidirectional shunting of PFO that was not closed initially on FERNANDO.  Negative Holter monitor.  Negative hypercoagulable workup.   --Discussed with patient and his wife indications for PFO closure under FERNANDO guidance and general anesthesia.  Indications for the procedure reviewed.  Benefits and risks of the procedure were gone over.  Risks include but are not limited to infection, bleeding, arrhythmia, TIA/stroke, hemodynamically, vascular tree, need for urgent surgery and death. --Following device closure he will need to be on Eliquis 5mg PO BID for at least 3 months.  Continue aspirin 81mg daily.  Eliquis will need to be held for 48 hours prior to the procedure. --He was informed that the PFO occluder device is compatabile with 1.5 and 3T magnets.  He will need to receive preprocedural antibiotics for the first 6 months following device closure. --Continue metoprolol succinate ER 50mg daily. --Continue pitavastatin 4mg daily. --EKG performed due to history of PFO/stroke.  All questions and concerns of the patient and his wife were addressed.  Findings discussed with cardiology/Dr. Del Toro.

## 2024-08-16 ENCOUNTER — NON-APPOINTMENT (OUTPATIENT)
Age: 59
End: 2024-08-16

## 2024-08-23 ENCOUNTER — TRANSCRIPTION ENCOUNTER (OUTPATIENT)
Age: 59
End: 2024-08-23

## 2024-08-23 ENCOUNTER — RESULT REVIEW (OUTPATIENT)
Age: 59
End: 2024-08-23

## 2024-08-23 ENCOUNTER — OUTPATIENT (OUTPATIENT)
Dept: OUTPATIENT SERVICES | Facility: HOSPITAL | Age: 59
LOS: 1 days | End: 2024-08-23
Payer: COMMERCIAL

## 2024-08-23 ENCOUNTER — APPOINTMENT (OUTPATIENT)
Dept: CV DIAGNOSITCS | Facility: HOSPITAL | Age: 59
End: 2024-08-23

## 2024-08-23 VITALS
RESPIRATION RATE: 17 BRPM | HEART RATE: 68 BPM | SYSTOLIC BLOOD PRESSURE: 101 MMHG | DIASTOLIC BLOOD PRESSURE: 61 MMHG | OXYGEN SATURATION: 96 %

## 2024-08-23 VITALS
OXYGEN SATURATION: 98 % | SYSTOLIC BLOOD PRESSURE: 131 MMHG | RESPIRATION RATE: 16 BRPM | WEIGHT: 289.91 LBS | DIASTOLIC BLOOD PRESSURE: 78 MMHG | TEMPERATURE: 98 F | HEART RATE: 82 BPM | HEIGHT: 74 IN

## 2024-08-23 DIAGNOSIS — I63.9 CEREBRAL INFARCTION, UNSPECIFIED: ICD-10-CM

## 2024-08-23 DIAGNOSIS — Z78.9 OTHER SPECIFIED HEALTH STATUS: Chronic | ICD-10-CM

## 2024-08-23 LAB
ANION GAP SERPL CALC-SCNC: 11 MMOL/L — SIGNIFICANT CHANGE UP (ref 5–17)
BUN SERPL-MCNC: 16 MG/DL — SIGNIFICANT CHANGE UP (ref 7–23)
CALCIUM SERPL-MCNC: 9.3 MG/DL — SIGNIFICANT CHANGE UP (ref 8.4–10.5)
CHLORIDE SERPL-SCNC: 105 MMOL/L — SIGNIFICANT CHANGE UP (ref 96–108)
CO2 SERPL-SCNC: 24 MMOL/L — SIGNIFICANT CHANGE UP (ref 22–31)
CREAT SERPL-MCNC: 1.09 MG/DL — SIGNIFICANT CHANGE UP (ref 0.5–1.3)
EGFR: 78 ML/MIN/1.73M2 — SIGNIFICANT CHANGE UP
GLUCOSE SERPL-MCNC: 127 MG/DL — HIGH (ref 70–99)
HCT VFR BLD CALC: 46.9 % — SIGNIFICANT CHANGE UP (ref 39–50)
HGB BLD-MCNC: 15.9 G/DL — SIGNIFICANT CHANGE UP (ref 13–17)
MCHC RBC-ENTMCNC: 31.7 PG — SIGNIFICANT CHANGE UP (ref 27–34)
MCHC RBC-ENTMCNC: 33.9 GM/DL — SIGNIFICANT CHANGE UP (ref 32–36)
MCV RBC AUTO: 93.4 FL — SIGNIFICANT CHANGE UP (ref 80–100)
NRBC # BLD: 0 /100 WBCS — SIGNIFICANT CHANGE UP (ref 0–0)
PLATELET # BLD AUTO: 210 K/UL — SIGNIFICANT CHANGE UP (ref 150–400)
POTASSIUM SERPL-MCNC: 4.4 MMOL/L — SIGNIFICANT CHANGE UP (ref 3.5–5.3)
POTASSIUM SERPL-SCNC: 4.4 MMOL/L — SIGNIFICANT CHANGE UP (ref 3.5–5.3)
RBC # BLD: 5.02 M/UL — SIGNIFICANT CHANGE UP (ref 4.2–5.8)
RBC # FLD: 12.7 % — SIGNIFICANT CHANGE UP (ref 10.3–14.5)
SODIUM SERPL-SCNC: 140 MMOL/L — SIGNIFICANT CHANGE UP (ref 135–145)
WBC # BLD: 15.54 K/UL — HIGH (ref 3.8–10.5)
WBC # FLD AUTO: 15.54 K/UL — HIGH (ref 3.8–10.5)

## 2024-08-23 PROCEDURE — 93325 DOPPLER ECHO COLOR FLOW MAPG: CPT

## 2024-08-23 PROCEDURE — C9399: CPT

## 2024-08-23 PROCEDURE — 93010 ELECTROCARDIOGRAM REPORT: CPT

## 2024-08-23 PROCEDURE — C1887: CPT

## 2024-08-23 PROCEDURE — 93662 INTRACARDIAC ECG (ICE): CPT | Mod: 26

## 2024-08-23 PROCEDURE — 71046 X-RAY EXAM CHEST 2 VIEWS: CPT | Mod: 26

## 2024-08-23 PROCEDURE — 85027 COMPLETE CBC AUTOMATED: CPT

## 2024-08-23 PROCEDURE — 93308 TTE F-UP OR LMTD: CPT

## 2024-08-23 PROCEDURE — C1769: CPT

## 2024-08-23 PROCEDURE — C1889: CPT

## 2024-08-23 PROCEDURE — 99232 SBSQ HOSP IP/OBS MODERATE 35: CPT

## 2024-08-23 PROCEDURE — 93580 TRANSCATH CLOSURE OF ASD: CPT

## 2024-08-23 PROCEDURE — 80048 BASIC METABOLIC PNL TOTAL CA: CPT

## 2024-08-23 PROCEDURE — 71046 X-RAY EXAM CHEST 2 VIEWS: CPT

## 2024-08-23 PROCEDURE — C1766: CPT

## 2024-08-23 PROCEDURE — 93312 ECHO TRANSESOPHAGEAL: CPT

## 2024-08-23 PROCEDURE — 93010 ELECTROCARDIOGRAM REPORT: CPT | Mod: 77

## 2024-08-23 PROCEDURE — 93308 TTE F-UP OR LMTD: CPT | Mod: 26,77

## 2024-08-23 PROCEDURE — C1760: CPT

## 2024-08-23 PROCEDURE — 93320 DOPPLER ECHO COMPLETE: CPT

## 2024-08-23 PROCEDURE — 93662 INTRACARDIAC ECG (ICE): CPT

## 2024-08-23 PROCEDURE — 93325 DOPPLER ECHO COLOR FLOW MAPG: CPT | Mod: 26,77

## 2024-08-23 PROCEDURE — 93005 ELECTROCARDIOGRAM TRACING: CPT

## 2024-08-23 PROCEDURE — C1894: CPT

## 2024-08-23 RX ORDER — BENZOCAINE/MENTHOL 20 %-0.5 %
1 AEROSOL (GRAM) TOPICAL
Refills: 0 | Status: ACTIVE | OUTPATIENT
Start: 2024-08-23

## 2024-08-23 RX ORDER — CEFAZOLIN SODIUM 2 G/100ML
3000 INJECTION, SOLUTION INTRAVENOUS ONCE
Refills: 0 | Status: COMPLETED | OUTPATIENT
Start: 2024-08-23 | End: 2024-08-23

## 2024-08-23 RX ORDER — ACETAMINOPHEN 325 MG/1
1000 TABLET ORAL EVERY 6 HOURS
Refills: 0 | Status: ACTIVE | OUTPATIENT
Start: 2024-08-23 | End: 2025-07-22

## 2024-08-23 RX ORDER — ACETAMINOPHEN 325 MG/1
2 TABLET ORAL
Qty: 0 | Refills: 0 | DISCHARGE
Start: 2024-08-23

## 2024-08-23 RX ORDER — SODIUM CHLORIDE 9 MG/ML
150 INJECTION INTRAMUSCULAR; INTRAVENOUS; SUBCUTANEOUS
Refills: 0 | Status: COMPLETED | OUTPATIENT
Start: 2024-08-23 | End: 2024-08-23

## 2024-08-23 RX ORDER — BACITRACIN 500 UNIT/G
1 OINTMENT (GRAM) TOPICAL ONCE
Refills: 0 | Status: COMPLETED | OUTPATIENT
Start: 2024-08-23 | End: 2024-08-23

## 2024-08-23 RX ORDER — SODIUM CHLORIDE 9 MG/ML
250 INJECTION INTRAMUSCULAR; INTRAVENOUS; SUBCUTANEOUS
Refills: 0 | Status: ACTIVE | OUTPATIENT
Start: 2024-08-23 | End: 2024-08-23

## 2024-08-23 RX ADMIN — SODIUM CHLORIDE 75 MILLILITER(S): 9 INJECTION INTRAMUSCULAR; INTRAVENOUS; SUBCUTANEOUS at 10:09

## 2024-08-23 RX ADMIN — Medication 1 LOZENGE: at 15:24

## 2024-08-23 RX ADMIN — Medication 1 APPLICATION(S): at 17:30

## 2024-08-23 RX ADMIN — SODIUM CHLORIDE 250 MILLILITER(S): 9 INJECTION INTRAMUSCULAR; INTRAVENOUS; SUBCUTANEOUS at 08:07

## 2024-08-23 RX ADMIN — CEFAZOLIN SODIUM 200 MILLIGRAM(S): 2 INJECTION, SOLUTION INTRAVENOUS at 16:31

## 2024-08-23 NOTE — ASU PREOP CHECKLIST - HEIGHT IN INCHES
"Post op check    Surgery: bilateral lower extremity angiogram via bilateral femoral approach with placement of bilateral common iliac stent    Subjective:   No acute events. Afebrile, VSS. Post surgical pain well controlled, on pain meds. States numbness and pain in right foot has significantly improved. Objective:   Vitals:    08/28/23 1141 08/28/23 1250 08/28/23 1350 08/28/23 1409   BP: (!) 161/53   (!) 178/65   Pulse: (!) 55   64   Resp: 18 18 18 18   Temp: 98.2 Â°F (36.8 Â°C)   97.2 Â°F (36.2 Â°C)   TempSrc: Oral      SpO2: 99%   95%   Weight:       Height:            General- in no acute distress   HEENT- normocephalic, atraumatic   Cardiac- regular rate, well perfused   Pulm- unlabored respirations, symmetric chest rise   Abd- soft,  Ext- moves all 4   Neuro- no focal deficits  Psych- cooperative   Vascular- palpable femoral pulses bilaterally, L DP/PT signals appreciated. Right PT signal appreciated. Dressing CDI. Groin is soft bilaterally, no masses appreciated. Intake/Output Summary (Last 24 hours) at 8/28/2023 1459  Last data filed at 8/28/2023 1426  Gross per 24 hour   Intake 740 ml   Output --   Net 740 ml       Labs    No results for input(s): ""SODIUM\"", \""POTASSIUM\"", \""CHLORIDE\"", \""CO2\"", \""BUN\"", \""CREATININE\"", \""GLUCOSE\"", \""WBC\"", \""HCT\"", \""HGB\"", \""PLT\"", \""AST\"", \""GPT\"", \""GGTP\"", \""ALKPT\"", \""BILIRUBIN\"" in the last 72 hours.         Assessment/Plan   Patient is s/p  bilateral lower extremity angiogram via bilateral femoral approach with placement of bilateral common iliac stent    Continue current management  Continue aspirin and xarelto  Continue pain control  Endocrinology on board for management of insulin pump    Jacky Flores, PGY-1  Vascular Surgery  Pager   "
2

## 2024-08-23 NOTE — H&P CARDIOLOGY - NSICDXFAMILYHX_GEN_ALL_CORE_FT
FAMILY HISTORY:  Grandparent  Still living? No  Family history of stroke (cerebrovascular), Age at diagnosis: Age Unknown

## 2024-08-23 NOTE — PROGRESS NOTE ADULT - ASSESSMENT
59 y/o Male w/ PMHx of cryptogenic stroke 2009, PFO s/p closure, recent TIA 9/2016 (residual l-sided weakness/aphasia) with new CVA 6/2024, PE (on Eliquis); TTE on 7/17/24 showed ASD/PFO closure device did not occlude PFO; presents for PFO closure.    # PFO  - s/p successful PFO closure (30mm Amplatzer occluder) via RFV (9Fr s/p vascade closure device) under general anesthesia on 8/23  - Continue ASA 81mg qd  - Resume Eliquis 5mg BID tonight   - Ancef 3g x1 at 1530  - CXR PA/lat after bedrest  - Limited TTE  - DC pending clinical progression    Monet Gould PA-C  Invasive cardiology  [phone] 57 y/o Male w/ PMHx of cryptogenic stroke 2009, PFO s/p closure, recent TIA 9/2016 (residual l-sided weakness/aphasia) with new CVA 6/2024, PE (on Eliquis); TTE on 7/17/24 showed ASD/PFO closure device did not occlude PFO; presents for PFO closure.    # PFO  - s/p successful PFO closure (30mm Amplatzer occluder) via RFV (9Fr s/p vascade closure device) under general anesthesia on 8/23  - Continue ASA 81mg qd  - Resume Eliquis 5mg BID tonight   - Ancef 3g x1 at 1530  - CXR PA/lat after bedrest  - Limited TTE  - DC in 6-8hrs if stable    Monet Gould PA-C  Invasive cardiology  [phone]

## 2024-08-23 NOTE — ASU DISCHARGE PLAN (ADULT/PEDIATRIC) - CARE PROVIDER_API CALL
Paramjit Pace  Interventional Cardiology  [address] [place] [address], [address] [place] [id number]  Phone: [phone]  Fax: [phone]  Established Patient  Follow Up Time: Routine    Tutu Del Toro  Cardiology  [address], [address]  [place] [address]  Phone: [phone]  Fax: [phone]  Established Patient  Follow Up Time: 2 weeks

## 2024-08-23 NOTE — PROGRESS NOTE ADULT - SUBJECTIVE AND OBJECTIVE BOX
59 y/o Male w/ PMHx of cryptogenic stroke 2009, PFO s/p closure, recent TIA 9/2016 (residual l-sided weakness/aphasia) with new CVA 6/2024, PE (on Eliquis); TTE on 7/17/24 showed ASD/PFO closure device did not occlude PFO; presents for PFO closure.    Vital Signs Last 24 Hrs  T(C): 36.7 (23 Aug 2024 10:35), Max: 36.8 (23 Aug 2024 07:36)  T(F): 98 (23 Aug 2024 10:35), Max: 98.3 (23 Aug 2024 07:36)  HR: 65 (23 Aug 2024 10:50) (65 - 82)  BP: 94/51 (23 Aug 2024 10:50) (92/57 - 131/78)  BP(mean): 95 (23 Aug 2024 07:29) (95 - 95)  RR: 17 (23 Aug 2024 10:50) (16 - 19)  SpO2: 95% (23 Aug 2024 10:50) (94% - 98%)    Parameters below as of 23 Aug 2024 10:50  Patient On (Oxygen Delivery Method): room air    Limited physical examination:   General: awake, alert, NAD  Vascular: Right groin site stable; soft, non-tender, no hematoma, no bruit, +pulses      Plan:   8/23: s/p successful PFO closure (30mm Amplatzer occluder) via RFV (9Fr s/p vascade closure device) under general anesthesia  - Continue ASA 81mg qd  - Resume Eliquis 5mg BID tonight   - Ancef 3g x1 at 1530  - CXR PA/lat after bedrest  - Limited TTE  - DC pending clinical progression    Monet Gould PA-C  Invasive cardiology  [phone] [facility] INVASIVE CARDIOLOGY -- Harrison Cui, Ezequiel Jerez, Rashida, Matheus, Bradley, Jesus, Sanjeev, Paradise-Je, ISIDRO Greene  Cardiac cath/EP lab - [facility] Team  [phone]     Chief complaint: Patient is a 58y old  Male who presents with a chief complaint of     HPI:  This is a 59 yo  M with PMHX of cryptogenic stroke 2009, PFO s/p closure, recent TIA 9/2016 (L sided weakness, aphasia), recent near syncopal episode with right-sided weakness and numbness in 6/2024, his brain MRI showed a new CVA. His TTE at that time normal LV function, dilated aortic root 4.6 cm, negative bubble study. His repeated MRI brain on 7/10/2024 revealed some white matter changes considered nonspecific and old right occipital infarct but no acute findings. Chest CTA revealed findings for PE, started Eliquis. Venous Doppler was negative for DVT. Pt then again back to [facility] ER for "wobbliness", right-sided tingling, denied any right-sided weakness. Brain imaging showed no new CVA, advised to add clopidogrel 75 mg daily for 3 weeks to his medical regimen. And FERNANDO on 7/17/2024 showed ASD/PFO closure device does not occlude the PFO and appears deployed anterior and caudal to the area shunting. Pt was evaluated by Dr. Pace and referred for PFO closure. pt denies SOB, dizziness or any other discomfort.  (23 Aug 2024 07:29)      Subjective/Observations: Patient seen and assessed at bedside. Denies changes in vision, headaches, dizziness, chest pain, palpitations, SOB, abdominal pain, N/V/D, leg pain/edema or any other complaints.       ROS Negative except as per Subjective and HPI      PAST MEDICAL & SURGICAL HISTORY:  HLD (hyperlipidemia)      TIA (transient ischemic attack)      PFO (patent foramen ovale)      MI (myocardial infarction)      No pertinent past surgical history      ALLERGIES:  No Known Allergies      MEDICATIONS:    ceFAZolin   IVPB 3000 milliGRAM(s) IV Intermittent once    sodium chloride 0.9%. 250 milliLiter(s) IV Continuous <Continuous>      TELEMETRY:  T(C): 36.7 (08-23-24 @ 10:35), Max: 36.8 (08-23-24 @ 07:36)  HR: 65 (08-23-24 @ 10:50) (65 - 82)  BP: 94/51 (08-23-24 @ 10:50) (92/57 - 131/78)  RR: 17 (08-23-24 @ 10:50) (16 - 19)  SpO2: 95% (08-23-24 @ 10:50) (94% - 98%)  Wt(kg): --  I&O's Summary    Height (cm): 188 (08-23 @ 08:02)  Weight (kg): 131.5 (08-23 @ 08:02)  BMI (kg/m2): 37.2 (08-23 @ 08:02)  BSA (m2): 2.54 (08-23 @ 08:02)  Wall:  Central/PICC/Mid Line:                                         FOCUSED PHYSICAL EXAM:  Appearance: Normal  Cardiovascular: Normal S1 S2, No JVD, No murmurs, rubs, gallops or clicks  Respiratory: Lungs clear to auscultation. No Rales, Rhonchi, Wheezing	  Vascular: right groin site stable w/o bleeding or hematoma, soft, + pulses     ECG:  	  RADIOLOGY:   DIAGNOSTIC TESTING:  < from: Transthoracic Echocardiogram (10.28.16 @ 09:30) >  Conclusions:  1. Mild aortic root dilatation  (Ao: 4.2 cm at the sinuses  of Valsalva).  2. Normal left ventricular internal dimensions and wall  thicknesses.  3. Normal left ventricular systolic function. No segmental  wall motion abnormalities.  4. Mild diastolic dysfunction (Stage I).  5. Normal right ventricular size and function.  *** No previous Echo exam.    < end of copied text >    [ ] Catheterization:  [ ] Stress Test:      < from: FERNANDO W or WO Ultrasound Enhancing Agent (07.17.24 @ 07:54) >  CONCLUSIONS:      1. Moderately enlarged right ventricular cavity size.   2. Dilated coronary sinus.   3. No evidence of left atrial or left atrial appendage thrombus.   4. There is a patent mckinnon ovale as described which demonstrates bidirectional shunting. There is an atrial septal/patent mckinnon ovale closure device in place. The patent mckinnon ovale is not occluded by the device. Correlation to patient procedural history and clinical correlation is recommended.   5. No prior FERNANDO available for comparison.    < end of copied text >    [ ] CCTA  	  < from: Cardiac Catheterization (08.23.24 @ 09:21) >  Procedures Performed   Procedures:                 1.    Ultrasound Guided Access   2.    Transesophageal Echo   3.    Intubation - Non Cath Physician   4.    PFO Closure   5.    Vascade   6.    Venous Access - Right Femoral   7.    RHC     Conclusions:    Status post successful closure of PFO using a 30mm Amplatzer occluder  device with FERNANDO and fluoroscopy guidance  < end of copied text >      LABS: All labs reviewed	 	  CARDIAC MARKERS:                        15.9   15.54 )-----------( 210      ( 23 Aug 2024 07:50 )             46.9     08-23    140  |  105  |  16  ----------------------------<  127<H>  4.4   |  24  |  1.09    Ca    9.3      23 Aug 2024 07:50      proBNP:   Lipid Profile:   HgA1c:   TSH:

## 2024-08-23 NOTE — ASU DISCHARGE PLAN (ADULT/PEDIATRIC) - ASU DC SPECIAL INSTRUCTIONSFT
Wound Care:   the day AFTER your procedure remove bandage GENTLY, and clean using  mild soap and gentle warm, water stream, pat dry. leave OPEN to air. YOU MAY SHOWER   DO NOT apply lotions, creams, ointments, powder, perfumes to your incision site  DO NOT SOAK your site for 1 week ( no baths, no pools, no tubs, etc...)  Check  your groin and/or wrist daily. A small amount of bruising, and soreness are normal    ACTIVITY: for 24 hours   - DO NOT DRIVE  - DO NOT make any important decisions or sign legal documents   - DO NOT operate heavy machineries   - you may resume sexual activity in 48 hours, unless otherwise instructed by your cardiologist     If your procedure was done through the [facility]: for the NEXT 5 DAYS  - Limit climbing stairs, DO NOT soak in bathtub or pool  - no strenuous activities, pushing, pulling, straining  - Do not lift anything 10lbs or heavier     MEDICATION:   take your medications as explained ( see discharge paperwork)     Follow heart healthy diet recommended by your doctor, , if you smoke STOP SMOKING ( may call [phone] for center of tobacco control if you need assistance)     CALL your doctor to make appointment in 2 WEEKS     ***CALL YOUR DOCTOR***  if you experience: fever, chills, body aches, or severe pain, swelling, redness, heat or yellow discharge at incision site  If you experience Bleeding or excruciating pain at the procedural site, swelling (golf ball size) at your procedural site  If you experience CHEST PAIN  If you experience extremity numbness, tingling, temperature change (of your procedural site)   If you are unable to reach your doctor, you may contact:   -Cardiology Office at [facility] at [phone] or   - [facility] [phone]  - [facility] [phone]

## 2024-08-23 NOTE — ASU DISCHARGE PLAN (ADULT/PEDIATRIC) - PROVIDER TOKENS
PROVIDER:[TOKEN:[[id number]],FOLLOWUP:[Routine],ESTABLISHEDPATIENT:[T]],PROVIDER:[TOKEN:[[id number]],FOLLOWUP:[2 weeks],ESTABLISHEDPATIENT:[T]]

## 2024-08-23 NOTE — ASU PREOP CHECKLIST - RESPIRATORY RATE (BREATHS/MIN)
Labs are overall stable but LDL is above goal.   Stop atorvastatin.   Start rosuvastatin 20mg at bedtime.
18

## 2024-08-23 NOTE — ASU PATIENT PROFILE, ADULT - NSICDXPASTMEDICALHX_GEN_ALL_CORE_FT
middle and lower back pain x Tuesday  seen at [facility] yesterday, told to f/u outpatient with ortho PAST MEDICAL HISTORY:  HLD (hyperlipidemia)     MI (myocardial infarction)     PFO (patent foramen ovale)     TIA (transient ischemic attack)

## 2024-08-23 NOTE — H&P CARDIOLOGY - HISTORY OF PRESENT ILLNESS
This is a 57 yo  M with PMHX of cryptogenic stroke and MI in 2009, found at the time to have PFO s/p closure, recent TIA 9/2016 (L sided weakness, aphasia) , MI and PFO, recent near syncopal episode with right-sided weakness and numbness in 6/2024. Brain MRI showed a new CVA.   Echo normal LV function, dilated aortic root 4.6 cm, reported negative bubble study. Repeated MRI brain on 7/10/2024 revealed some white matter changes considered nonspecific and old right occipital infarct but no acute findings. Chest CTA revealed findings for PE, started Eliquis. Venous Doppler was negative for DVT. Pt then again back to [facility] ER for "wobbliness", right-sided tingling, denied any right-sided weakness. Brain imaging showed no new CVA, advised to add clopidogrel 75 mg daily for the next 3 weeks to his medical regimen. The his FERNANDO on 7/17/2024 showed ASD/PFO closure device does not occlude the PFO and appears deployed anterior and caudal to the area shunting. Venous duplex study on 6/26/2024 demonstrate no evidence of DVT in either lower extremity. Pt was evaluated by Dr. Pace and referred for PFO closure.     < from: FERNANDO W or WO Ultrasound Enhancing Agent (07.17.24 @ 07:54) >   1. Moderately enlarged right ventricular cavity size.   2. Dilated coronary sinus.   3. No evidence of left atrial or left atrial appendage thrombus.   4. There is a patent mckinnon ovale as described which demonstrates bidirectional shunting. There is an atrial septal/patent mckinnon ovale closure device in place. The patent mckinnon ovale is not occluded by the device. Correlation to patient procedural history and clinical correlation is recommended.   5. No prior FERNANDO available for comparison.    < end of copied text >    ?         This is a 59 yo  M with PMHX of cryptogenic stroke 2009, PFO s/p closure, recent TIA 9/2016 (L sided weakness, aphasia), recent near syncopal episode with right-sided weakness and numbness in 6/2024, his brain MRI showed a new CVA. His TTE at that time normal LV function, dilated aortic root 4.6 cm, negative bubble study. His repeated MRI brain on 7/10/2024 revealed some white matter changes considered nonspecific and old right occipital infarct but no acute findings. Chest CTA revealed findings for PE, started Eliquis. Venous Doppler was negative for DVT.   Pt then again back to [facility] ER for "wobbliness", right-sided tingling, denied any right-sided weakness. Brain imaging showed no new CVA, advised to add clopidogrel 75 mg daily for 3 weeks to his medical regimen. And FERNANDO on 7/17/2024 showed ASD/PFO closure device does not occlude the PFO and appears deployed anterior and caudal to the area shunting. Pt was evaluated by Dr. Pace and referred for PFO closure. pt denies SOB, dizziness or any other discomfort.     < from: FERNANDO W or WO Ultrasound Enhancing Agent (07.17.24 @ 07:54) >   1. Moderately enlarged right ventricular cavity size.   2. Dilated coronary sinus.   3. No evidence of left atrial or left atrial appendage thrombus.   4. There is a patent mckinnon ovale as described which demonstrates bidirectional shunting. There is an atrial septal/patent mckinnon ovale closure device in place. The patent mckinnon ovale is not occluded by the device. Correlation to patient procedural history and clinical correlation is recommended.   5. No prior FERNANDO available for comparison.    < end of copied text >    ?

## 2024-09-03 ENCOUNTER — NON-APPOINTMENT (OUTPATIENT)
Age: 59
End: 2024-09-03

## 2024-09-10 ENCOUNTER — APPOINTMENT (OUTPATIENT)
Dept: CARDIOLOGY | Facility: CLINIC | Age: 59
End: 2024-09-10
Payer: COMMERCIAL

## 2024-09-10 ENCOUNTER — NON-APPOINTMENT (OUTPATIENT)
Age: 59
End: 2024-09-10

## 2024-09-10 VITALS
DIASTOLIC BLOOD PRESSURE: 76 MMHG | OXYGEN SATURATION: 95 % | RESPIRATION RATE: 19 BRPM | SYSTOLIC BLOOD PRESSURE: 111 MMHG | BODY MASS INDEX: 38.17 KG/M2 | HEIGHT: 73 IN | HEART RATE: 66 BPM | WEIGHT: 288 LBS

## 2024-09-10 DIAGNOSIS — Q21.12 PATENT FORAMEN OVALE: ICD-10-CM

## 2024-09-10 DIAGNOSIS — I47.29 OTHER VENTRICULAR TACHYCARDIA: ICD-10-CM

## 2024-09-10 DIAGNOSIS — I77.810 THORACIC AORTIC ECTASIA: ICD-10-CM

## 2024-09-10 DIAGNOSIS — I25.10 ATHEROSCLEROTIC HEART DISEASE OF NATIVE CORONARY ARTERY W/OUT ANGINA PECTORIS: ICD-10-CM

## 2024-09-10 PROCEDURE — 99215 OFFICE O/P EST HI 40 MIN: CPT | Mod: 25

## 2024-09-10 PROCEDURE — 93000 ELECTROCARDIOGRAM COMPLETE: CPT

## 2024-09-10 NOTE — HISTORY OF PRESENT ILLNESS
[FreeTextEntry1] : Since his last visit with me, he underwent successful closure of PFO at [facility]. The procedure went well without any complications. He is scheduled to have implantable loop recorder placed later this week. He has been feeling well.  Moderately active. No complaints of chest pain or chest pressure.  No shortness of breath.  No palpitations.  No dizziness or lightheadedness.  No syncope.  No edema, orthopnea.  No PND. Remains on anticoagulation.  Denies any excessive ecchymosis, bleeding, black or bloody stools, hematuria or epistaxis.

## 2024-09-10 NOTE — DISCUSSION/SUMMARY
[FreeTextEntry1] : 58-year-old man with CVA, no residual deficits.  Known history of PFO, status post prior attempted PFO closure, found to have shunting across the PFO on FERNANDO. He is now status post closure of PFO and follow-up FERNANDO demonstrates no shunting. Other issues include pulmonary embolism on anticoagulation, cardiac arrhythmia, nonsustained VT, aortic root enlargement, mild CAD, hyperlipidemia. Moderately active, asymptomatic with respect to cardiac issues. On physical examination, blood pressure is stable.  He is euvolemic.  No new cardiac murmurs rubs or gallops are noted. EKG is normal sinus rhythm, incomplete right bundle branch block, unchanged from prior. Current cardiac status appears to be stable.  Plan 1.  Current medication list is reviewed, no changes. 2.  He will continue with anticoagulation.  He does have follow-up with hematology. 3.  Continue with metoprolol succinate 50 mg daily. 4.  Continue with statin atorvastatin 4 mg daily. 5.  ILR placement is scheduled for this week.  Follow-up with device clinic for any recurrent arrhythmias. 6.  He will follow-up with me in the office in 3 months 7.  Advised him to monitor for any cardiac symptoms and to report back for any changes or if any other concerns.  Cardiac issues were discussed with him and also with his wife over the telephone and all of their questions have been answered to their satisfaction.  [EKG obtained to assist in diagnosis and management of assessed problem(s)] : EKG obtained to assist in diagnosis and management of assessed problem(s)

## 2024-09-10 NOTE — REASON FOR VISIT
[FreeTextEntry1] : Cardiology follow-up visit status post recent hospitalization for procedure to close PFO. He has known history of PFO, status post prior attempted closure.  Status post CVA.  Pulmonary embolism on anticoagulation.  Cardiac arrhythmia, nonsustained VT.  Aortic root enlargement, mild CAD, hyperlipidemia.

## 2024-09-10 NOTE — CARDIOLOGY SUMMARY
[___] : [unfilled] [de-identified] : Sept 10 2024  Normal sinus rhythm.  Incomplete right bundle branch block. [de-identified] : August 23, 2024.  Limited TTE after PFO closure.  No pericardial effusion seen.  Septal occluder device noted in the intra-atrial septum.  The device appears well-seated.  No residual intra-atrial shunting is seen. August 23, 2024.  FERNANDO guidance provided during PFO closure.Normal left ventricular systolic function.  Dilated aortic root.  The ascending aorta is normal in size.  There is an atrial septal occluder device recently placed at an outside hospital along the interatrial septum.  A PFO closure device (30 mm device placed today is visualized.  It is well-seated with no residual leak. June 18, 2024.  [facility].  Transthoracic echocardiography with intravenous contrast and agitated saline injection.  Normal left ventricular systolic function.  Dilated aortic root 4.6 cm.  Mildly dilated right ventricle.  Normal RV function.  Atrial septum: Agitated saline contrast study at baseline or with provocation shows no right to left atrial  level shunt. [de-identified] : June 26, 2024.  Stable enlarged aortic root measuring 4.2 cm at the level of sinus of Valsalva.  Bilateral segmental pulmonary emboli.  No CT evidence of right heart strain.  Oct 11, 2022. Coronary calcium score 55.  Minimal CAD.  No moderate or severe CAD idenitified.

## 2024-09-12 ENCOUNTER — TRANSCRIPTION ENCOUNTER (OUTPATIENT)
Age: 59
End: 2024-09-12

## 2024-09-12 ENCOUNTER — OUTPATIENT (OUTPATIENT)
Dept: OUTPATIENT SERVICES | Facility: HOSPITAL | Age: 59
LOS: 1 days | End: 2024-09-12
Payer: COMMERCIAL

## 2024-09-12 ENCOUNTER — APPOINTMENT (OUTPATIENT)
Dept: CARDIOLOGY | Facility: CLINIC | Age: 59
End: 2024-09-12

## 2024-09-12 VITALS
OXYGEN SATURATION: 100 % | WEIGHT: 285.06 LBS | RESPIRATION RATE: 18 BRPM | HEIGHT: 74 IN | DIASTOLIC BLOOD PRESSURE: 84 MMHG | TEMPERATURE: 98 F | SYSTOLIC BLOOD PRESSURE: 131 MMHG | HEART RATE: 58 BPM

## 2024-09-12 VITALS
SYSTOLIC BLOOD PRESSURE: 132 MMHG | OXYGEN SATURATION: 100 % | HEART RATE: 61 BPM | DIASTOLIC BLOOD PRESSURE: 75 MMHG | RESPIRATION RATE: 17 BRPM | TEMPERATURE: 98 F

## 2024-09-12 VITALS
SYSTOLIC BLOOD PRESSURE: 117 MMHG | HEART RATE: 57 BPM | DIASTOLIC BLOOD PRESSURE: 84 MMHG | BODY MASS INDEX: 38.17 KG/M2 | OXYGEN SATURATION: 96 % | HEIGHT: 73 IN | WEIGHT: 288 LBS

## 2024-09-12 DIAGNOSIS — Z78.9 OTHER SPECIFIED HEALTH STATUS: Chronic | ICD-10-CM

## 2024-09-12 DIAGNOSIS — I63.9 CEREBRAL INFARCTION, UNSPECIFIED: ICD-10-CM

## 2024-09-12 DIAGNOSIS — I45.10 UNSPECIFIED RIGHT BUNDLE-BRANCH BLOCK: ICD-10-CM

## 2024-09-12 PROCEDURE — 33285 INSJ SUBQ CAR RHYTHM MNTR: CPT

## 2024-09-12 PROCEDURE — 99214 OFFICE O/P EST MOD 30 MIN: CPT

## 2024-09-12 PROCEDURE — 93000 ELECTROCARDIOGRAM COMPLETE: CPT

## 2024-09-12 PROCEDURE — G2211 COMPLEX E/M VISIT ADD ON: CPT | Mod: NC

## 2024-09-12 PROCEDURE — C1764: CPT

## 2024-09-12 RX ORDER — CEPHALEXIN 500 MG
500 CAPSULE ORAL ONCE
Refills: 0 | Status: COMPLETED | OUTPATIENT
Start: 2024-09-12 | End: 2024-09-12

## 2024-09-12 RX ORDER — CHLORHEXIDINE GLUCONATE 40 MG/ML
1 SOLUTION TOPICAL ONCE
Refills: 0 | Status: COMPLETED | OUTPATIENT
Start: 2024-09-12 | End: 2024-09-12

## 2024-09-12 RX ORDER — METOPROLOL TARTRATE 100 MG/1
1 TABLET ORAL
Refills: 0 | DISCHARGE

## 2024-09-12 RX ORDER — APIXABAN 5 MG/1
1 TABLET, FILM COATED ORAL
Qty: 0 | Refills: 0 | DISCHARGE

## 2024-09-12 RX ORDER — PITAVASTATIN MAGNESIUM 2 MG/1
1 TABLET, FILM COATED ORAL
Qty: 0 | Refills: 0 | DISCHARGE

## 2024-09-12 RX ADMIN — CHLORHEXIDINE GLUCONATE 1 APPLICATION(S): 40 SOLUTION TOPICAL at 11:00

## 2024-09-12 RX ADMIN — Medication 500 MILLIGRAM(S): at 11:45

## 2024-09-12 NOTE — ASU DISCHARGE PLAN (ADULT/PEDIATRIC) - ACTIVITY LEVEL
No exercise/No heavy lifting/No tub baths Dapsone Counseling: I discussed with the patient the risks of dapsone including but not limited to hemolytic anemia, agranulocytosis, rashes, methemoglobinemia, kidney failure, peripheral neuropathy, headaches, GI upset, and liver toxicity.  Patients who start dapsone require monitoring including baseline LFTs and weekly CBCs for the first month, then every month thereafter.  The patient verbalized understanding of the proper use and possible adverse effects of dapsone.  All of the patient's questions and concerns were addressed.

## 2024-09-12 NOTE — H&P CARDIOLOGY - HISTORY OF PRESENT ILLNESS
This is a 57 yo  M with no known implantable device with PMHX of cryptogenic stroke 2009, PFO s/p closure on Eliquis last dose on 9/11/24 , recent TIA 9/2016 (L sided weakness, aphasia), recent near syncopal episode with right-sided weakness and numbness in 6/2024, his brain MRI showed a new CVA. His TTE at that time normal LV function, dilated aortic root 4.6 cm, negative bubble study. His repeated MRI brain on 7/10/2024 revealed some white matter changes considered nonspecific and old right occipital infarct but no acute findings. Chest CTA revealed findings for PE, started Eliquis. Venous Doppler was negative for DVT.   Pt then again back to [facility] ER for "wobbliness", right-sided tingling, denied any right-sided weakness. Brain imaging showed no new CVA, advised to add clopidogrel 75 mg daily for 3 weeks to his medical regimen. And FERNANDO on 7/17/2024 showed ASD/PFO closure device does not occlude the PFO and appears deployed anterior and caudal to the area shunting. Pt now presents for ILR implant with Dr. Goldstein today . Currently CP free no sob no palpitations noted       < from: FERNANDO W or WO Ultrasound Enhancing Agent (07.17.24 @ 07:54) >   1. Moderately enlarged right ventricular cavity size.   2. Dilated coronary sinus.   3. No evidence of left atrial or left atrial appendage thrombus.   4. There is a patent mckinnon ovale as described which demonstrates bidirectional shunting. There is an atrial septal/patent mckinnon ovale closure device in place. The patent mckinnon ovale is not occluded by the device. Correlation to patient procedural history and clinical correlation is recommended.   5. No prior FERNANDO available for comparison.    < end of copied text >    ?

## 2024-09-12 NOTE — ASU DISCHARGE PLAN (ADULT/PEDIATRIC) - PROVIDER TOKENS
FREE:[LAST:[wound check],PHONE:[(   )    -],FAX:[(   )    -],ADDRESS:[[address] [place] [id number]]]
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
## 2024-09-12 NOTE — ASU DISCHARGE PLAN (ADULT/PEDIATRIC) - CARE PROVIDER_API CALL
wound check,   [address] [facility] [address]  Phone: (   )    -  Fax: (   )    -  Follow Up Time:

## 2024-09-23 ENCOUNTER — RESULT CHARGE (OUTPATIENT)
Age: 59
End: 2024-09-23

## 2024-09-24 ENCOUNTER — NON-APPOINTMENT (OUTPATIENT)
Age: 59
End: 2024-09-24

## 2024-09-24 ENCOUNTER — APPOINTMENT (OUTPATIENT)
Dept: ELECTROPHYSIOLOGY | Facility: CLINIC | Age: 59
End: 2024-09-24

## 2024-09-24 VITALS
BODY MASS INDEX: 30.08 KG/M2 | HEART RATE: 65 BPM | SYSTOLIC BLOOD PRESSURE: 112 MMHG | OXYGEN SATURATION: 98 % | WEIGHT: 228 LBS | DIASTOLIC BLOOD PRESSURE: 74 MMHG

## 2024-09-24 PROCEDURE — 99212 OFFICE O/P EST SF 10 MIN: CPT

## 2024-09-24 PROCEDURE — 93000 ELECTROCARDIOGRAM COMPLETE: CPT | Mod: 59

## 2024-09-24 PROCEDURE — 93291 INTERROG DEV EVAL SCRMS IP: CPT

## 2024-10-09 ENCOUNTER — RESULT REVIEW (OUTPATIENT)
Age: 59
End: 2024-10-09

## 2024-10-09 ENCOUNTER — APPOINTMENT (OUTPATIENT)
Dept: ULTRASOUND IMAGING | Facility: IMAGING CENTER | Age: 59
End: 2024-10-09
Payer: COMMERCIAL

## 2024-10-09 ENCOUNTER — OUTPATIENT (OUTPATIENT)
Dept: OUTPATIENT SERVICES | Facility: HOSPITAL | Age: 59
LOS: 1 days | End: 2024-10-09
Payer: COMMERCIAL

## 2024-10-09 DIAGNOSIS — Z00.8 ENCOUNTER FOR OTHER GENERAL EXAMINATION: ICD-10-CM

## 2024-10-09 DIAGNOSIS — Z78.9 OTHER SPECIFIED HEALTH STATUS: Chronic | ICD-10-CM

## 2024-10-09 DIAGNOSIS — K11.8 OTHER DISEASES OF SALIVARY GLANDS: ICD-10-CM

## 2024-10-09 PROCEDURE — 88173 CYTOPATH EVAL FNA REPORT: CPT

## 2024-10-09 PROCEDURE — 10005 FNA BX W/US GDN 1ST LES: CPT

## 2024-10-09 PROCEDURE — 88172 CYTP DX EVAL FNA 1ST EA SITE: CPT

## 2024-10-09 PROCEDURE — 88173 CYTOPATH EVAL FNA REPORT: CPT | Mod: 26

## 2024-10-11 LAB — NON-GYNECOLOGICAL CYTOLOGY STUDY: SIGNIFICANT CHANGE UP

## 2024-10-22 ENCOUNTER — OUTPATIENT (OUTPATIENT)
Dept: OUTPATIENT SERVICES | Facility: HOSPITAL | Age: 59
LOS: 1 days | Discharge: HOME | End: 2024-10-22

## 2024-10-22 DIAGNOSIS — Z78.9 OTHER SPECIFIED HEALTH STATUS: Chronic | ICD-10-CM

## 2024-10-22 DIAGNOSIS — I26.09 OTHER PULMONARY EMBOLISM WITH ACUTE COR PULMONALE: ICD-10-CM

## 2024-10-28 ENCOUNTER — RESULT REVIEW (OUTPATIENT)
Age: 59
End: 2024-10-28

## 2024-10-28 ENCOUNTER — APPOINTMENT (OUTPATIENT)
Dept: HEMATOLOGY ONCOLOGY | Facility: CLINIC | Age: 59
End: 2024-10-28

## 2024-10-28 VITALS
WEIGHT: 287.48 LBS | DIASTOLIC BLOOD PRESSURE: 85 MMHG | SYSTOLIC BLOOD PRESSURE: 125 MMHG | OXYGEN SATURATION: 97 % | BODY MASS INDEX: 37.93 KG/M2 | RESPIRATION RATE: 17 BRPM | TEMPERATURE: 97.8 F | HEART RATE: 57 BPM

## 2024-10-28 DIAGNOSIS — I27.82 CHRONIC PULMONARY EMBOLISM: ICD-10-CM

## 2024-10-28 DIAGNOSIS — R10.13 EPIGASTRIC PAIN: ICD-10-CM

## 2024-10-28 LAB
BASOPHILS # BLD AUTO: 0.04 K/UL — SIGNIFICANT CHANGE UP (ref 0–0.2)
BASOPHILS NFR BLD AUTO: 0.7 % — SIGNIFICANT CHANGE UP (ref 0–2)
EOSINOPHIL # BLD AUTO: 0.18 K/UL — SIGNIFICANT CHANGE UP (ref 0–0.5)
EOSINOPHIL NFR BLD AUTO: 3.1 % — SIGNIFICANT CHANGE UP (ref 0–6)
HCT VFR BLD CALC: 44.5 % — SIGNIFICANT CHANGE UP (ref 39–50)
HGB BLD-MCNC: 15.2 G/DL — SIGNIFICANT CHANGE UP (ref 13–17)
IMM GRANULOCYTES NFR BLD AUTO: 0.3 % — SIGNIFICANT CHANGE UP (ref 0–0.9)
LYMPHOCYTES # BLD AUTO: 1.27 K/UL — SIGNIFICANT CHANGE UP (ref 1–3.3)
LYMPHOCYTES # BLD AUTO: 22 % — SIGNIFICANT CHANGE UP (ref 13–44)
MCHC RBC-ENTMCNC: 31.8 PG — SIGNIFICANT CHANGE UP (ref 27–34)
MCHC RBC-ENTMCNC: 34.2 G/DL — SIGNIFICANT CHANGE UP (ref 32–36)
MCV RBC AUTO: 93.1 FL — SIGNIFICANT CHANGE UP (ref 80–100)
MONOCYTES # BLD AUTO: 0.8 K/UL — SIGNIFICANT CHANGE UP (ref 0–0.9)
MONOCYTES NFR BLD AUTO: 13.9 % — SIGNIFICANT CHANGE UP (ref 2–14)
NEUTROPHILS # BLD AUTO: 3.46 K/UL — SIGNIFICANT CHANGE UP (ref 1.8–7.4)
NEUTROPHILS NFR BLD AUTO: 60 % — SIGNIFICANT CHANGE UP (ref 43–77)
NRBC # BLD: 0 /100 WBCS — SIGNIFICANT CHANGE UP (ref 0–0)
NRBC BLD-RTO: 0 /100 WBCS — SIGNIFICANT CHANGE UP (ref 0–0)
PLATELET # BLD AUTO: 184 K/UL — SIGNIFICANT CHANGE UP (ref 150–400)
RBC # BLD: 4.78 M/UL — SIGNIFICANT CHANGE UP (ref 4.2–5.8)
RBC # FLD: 12.4 % — SIGNIFICANT CHANGE UP (ref 10.3–14.5)
WBC # BLD: 5.77 K/UL — SIGNIFICANT CHANGE UP (ref 3.8–10.5)
WBC # FLD AUTO: 5.77 K/UL — SIGNIFICANT CHANGE UP (ref 3.8–10.5)

## 2024-10-28 PROCEDURE — 99214 OFFICE O/P EST MOD 30 MIN: CPT

## 2024-10-29 ENCOUNTER — APPOINTMENT (OUTPATIENT)
Dept: OTOLARYNGOLOGY | Facility: CLINIC | Age: 59
End: 2024-10-29
Payer: COMMERCIAL

## 2024-10-29 ENCOUNTER — APPOINTMENT (OUTPATIENT)
Dept: ELECTROPHYSIOLOGY | Facility: CLINIC | Age: 59
End: 2024-10-29

## 2024-10-29 DIAGNOSIS — K11.8 OTHER DISEASES OF SALIVARY GLANDS: ICD-10-CM

## 2024-10-29 LAB
B2 GLYCOPROT1 IGG SER-ACNC: <1.4 U/ML
B2 GLYCOPROT1 IGM SER-ACNC: <1.5 U/ML
CARDIOLIPIN IGM SER-MCNC: <1.5 MPL U/ML
CARDIOLIPIN IGM SER-MCNC: <1.6 GPL U/ML
CONFIRM: 51.5 SEC
DRVVT IMM 1:2 NP PPP: NORMAL
DRVVT SCREEN TO CONFIRM RATIO: 0.71 RATIO
SCREEN DRVVT: 47 SEC

## 2024-10-29 PROCEDURE — 99214 OFFICE O/P EST MOD 30 MIN: CPT

## 2024-10-29 PROCEDURE — 93298 REM INTERROG DEV EVAL SCRMS: CPT

## 2024-11-22 ENCOUNTER — APPOINTMENT (OUTPATIENT)
Dept: OPHTHALMOLOGY | Facility: CLINIC | Age: 59
End: 2024-11-22
Payer: COMMERCIAL

## 2024-11-22 ENCOUNTER — NON-APPOINTMENT (OUTPATIENT)
Age: 59
End: 2024-11-22

## 2024-11-22 PROCEDURE — 92133 CPTRZD OPH DX IMG PST SGM ON: CPT

## 2024-11-22 PROCEDURE — 99204 OFFICE O/P NEW MOD 45 MIN: CPT

## 2024-11-22 PROCEDURE — 92083 EXTENDED VISUAL FIELD XM: CPT

## 2024-12-03 ENCOUNTER — NON-APPOINTMENT (OUTPATIENT)
Age: 59
End: 2024-12-03

## 2024-12-03 ENCOUNTER — APPOINTMENT (OUTPATIENT)
Dept: ELECTROPHYSIOLOGY | Facility: CLINIC | Age: 59
End: 2024-12-03

## 2024-12-03 PROCEDURE — 93298 REM INTERROG DEV EVAL SCRMS: CPT

## 2024-12-04 ENCOUNTER — RX RENEWAL (OUTPATIENT)
Age: 59
End: 2024-12-04

## 2024-12-06 ENCOUNTER — RESULT REVIEW (OUTPATIENT)
Age: 59
End: 2024-12-06

## 2024-12-06 ENCOUNTER — OUTPATIENT (OUTPATIENT)
Dept: OUTPATIENT SERVICES | Facility: HOSPITAL | Age: 59
LOS: 1 days | End: 2024-12-06
Payer: COMMERCIAL

## 2024-12-06 ENCOUNTER — APPOINTMENT (OUTPATIENT)
Dept: CV DIAGNOSITCS | Facility: HOSPITAL | Age: 59
End: 2024-12-06

## 2024-12-06 DIAGNOSIS — Z78.9 OTHER SPECIFIED HEALTH STATUS: Chronic | ICD-10-CM

## 2024-12-06 DIAGNOSIS — Q21.12 PATENT FORAMEN OVALE: ICD-10-CM

## 2024-12-06 PROCEDURE — 93306 TTE W/DOPPLER COMPLETE: CPT | Mod: 26

## 2024-12-06 PROCEDURE — 93306 TTE W/DOPPLER COMPLETE: CPT

## 2024-12-09 ENCOUNTER — NON-APPOINTMENT (OUTPATIENT)
Age: 59
End: 2024-12-09

## 2024-12-09 ENCOUNTER — APPOINTMENT (OUTPATIENT)
Dept: CARDIOLOGY | Facility: CLINIC | Age: 59
End: 2024-12-09
Payer: COMMERCIAL

## 2024-12-09 VITALS
DIASTOLIC BLOOD PRESSURE: 71 MMHG | RESPIRATION RATE: 18 BRPM | HEIGHT: 73 IN | BODY MASS INDEX: 36.84 KG/M2 | OXYGEN SATURATION: 96 % | SYSTOLIC BLOOD PRESSURE: 112 MMHG | WEIGHT: 278 LBS | HEART RATE: 75 BPM

## 2024-12-09 DIAGNOSIS — I47.29 OTHER VENTRICULAR TACHYCARDIA: ICD-10-CM

## 2024-12-09 DIAGNOSIS — I27.82 CHRONIC PULMONARY EMBOLISM: ICD-10-CM

## 2024-12-09 DIAGNOSIS — E78.5 HYPERLIPIDEMIA, UNSPECIFIED: ICD-10-CM

## 2024-12-09 DIAGNOSIS — Z95.818 PRESENCE OF OTHER CARDIAC IMPLANTS AND GRAFTS: ICD-10-CM

## 2024-12-09 DIAGNOSIS — Q21.12 PATENT FORAMEN OVALE: ICD-10-CM

## 2024-12-09 DIAGNOSIS — I47.19 OTHER SUPRAVENTRICULAR TACHYCARDIA: ICD-10-CM

## 2024-12-09 DIAGNOSIS — I63.9 CEREBRAL INFARCTION, UNSPECIFIED: ICD-10-CM

## 2024-12-09 DIAGNOSIS — I25.10 ATHEROSCLEROTIC HEART DISEASE OF NATIVE CORONARY ARTERY W/OUT ANGINA PECTORIS: ICD-10-CM

## 2024-12-09 PROCEDURE — 99215 OFFICE O/P EST HI 40 MIN: CPT | Mod: 25

## 2024-12-09 PROCEDURE — 93000 ELECTROCARDIOGRAM COMPLETE: CPT

## 2025-01-02 ENCOUNTER — APPOINTMENT (OUTPATIENT)
Dept: CARDIOLOGY | Facility: CLINIC | Age: 60
End: 2025-01-02
Payer: COMMERCIAL

## 2025-01-02 ENCOUNTER — NON-APPOINTMENT (OUTPATIENT)
Age: 60
End: 2025-01-02

## 2025-01-02 VITALS
SYSTOLIC BLOOD PRESSURE: 117 MMHG | HEIGHT: 73 IN | WEIGHT: 280 LBS | HEART RATE: 64 BPM | BODY MASS INDEX: 37.11 KG/M2 | DIASTOLIC BLOOD PRESSURE: 79 MMHG | OXYGEN SATURATION: 96 %

## 2025-01-02 DIAGNOSIS — I63.9 CEREBRAL INFARCTION, UNSPECIFIED: ICD-10-CM

## 2025-01-02 DIAGNOSIS — Q21.12 PATENT FORAMEN OVALE: ICD-10-CM

## 2025-01-02 DIAGNOSIS — E78.5 HYPERLIPIDEMIA, UNSPECIFIED: ICD-10-CM

## 2025-01-02 DIAGNOSIS — I10 ESSENTIAL (PRIMARY) HYPERTENSION: ICD-10-CM

## 2025-01-02 PROCEDURE — 93000 ELECTROCARDIOGRAM COMPLETE: CPT

## 2025-01-02 PROCEDURE — G2211 COMPLEX E/M VISIT ADD ON: CPT | Mod: NC

## 2025-01-02 PROCEDURE — 99214 OFFICE O/P EST MOD 30 MIN: CPT

## 2025-01-04 PROBLEM — E78.5 DYSLIPIDEMIA: Status: ACTIVE | Noted: 2025-01-04

## 2025-01-04 PROBLEM — I10 HYPERTENSION: Status: ACTIVE | Noted: 2025-01-04

## 2025-01-07 ENCOUNTER — NON-APPOINTMENT (OUTPATIENT)
Age: 60
End: 2025-01-07

## 2025-01-07 ENCOUNTER — APPOINTMENT (OUTPATIENT)
Dept: ELECTROPHYSIOLOGY | Facility: CLINIC | Age: 60
End: 2025-01-07
Payer: COMMERCIAL

## 2025-01-07 PROCEDURE — 93298 REM INTERROG DEV EVAL SCRMS: CPT

## 2025-01-09 NOTE — ASU DISCHARGE PLAN (ADULT/PEDIATRIC) - NPI NUMBER (FOR SYSADMIN USE ONLY) :
is in the final stages of Alzheimer dementia. He spends a lot of time in bed. 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 Adriana is adament that he remain on the farm until he dies. She is aware this is costing her her health. She regularly engages in psychological support with Dr. Carlisle which she has found very helpful. I will see her  in my clinic next week for an evaluation and probable referral to palliative care/hospice. She is managing his agitation with occasional lorazepam which has been helpful.    [UNKNOWN]

## 2025-01-15 ENCOUNTER — APPOINTMENT (OUTPATIENT)
Dept: CARDIOLOGY | Facility: CLINIC | Age: 60
End: 2025-01-15
Payer: COMMERCIAL

## 2025-01-15 ENCOUNTER — NON-APPOINTMENT (OUTPATIENT)
Age: 60
End: 2025-01-15

## 2025-01-15 VITALS
HEIGHT: 73 IN | DIASTOLIC BLOOD PRESSURE: 66 MMHG | BODY MASS INDEX: 36.45 KG/M2 | OXYGEN SATURATION: 95 % | SYSTOLIC BLOOD PRESSURE: 94 MMHG | HEART RATE: 68 BPM | WEIGHT: 275 LBS

## 2025-01-15 DIAGNOSIS — I47.29 OTHER VENTRICULAR TACHYCARDIA: ICD-10-CM

## 2025-01-15 DIAGNOSIS — Z95.818 PRESENCE OF OTHER CARDIAC IMPLANTS AND GRAFTS: ICD-10-CM

## 2025-01-15 DIAGNOSIS — I25.10 ATHEROSCLEROTIC HEART DISEASE OF NATIVE CORONARY ARTERY W/OUT ANGINA PECTORIS: ICD-10-CM

## 2025-01-15 DIAGNOSIS — E78.5 HYPERLIPIDEMIA, UNSPECIFIED: ICD-10-CM

## 2025-01-15 DIAGNOSIS — I77.810 THORACIC AORTIC ECTASIA: ICD-10-CM

## 2025-01-15 DIAGNOSIS — I10 ESSENTIAL (PRIMARY) HYPERTENSION: ICD-10-CM

## 2025-01-15 DIAGNOSIS — I47.19 OTHER SUPRAVENTRICULAR TACHYCARDIA: ICD-10-CM

## 2025-01-15 PROCEDURE — 99215 OFFICE O/P EST HI 40 MIN: CPT

## 2025-01-15 PROCEDURE — G2211 COMPLEX E/M VISIT ADD ON: CPT | Mod: NC

## 2025-01-15 PROCEDURE — 93000 ELECTROCARDIOGRAM COMPLETE: CPT

## 2025-01-24 ENCOUNTER — OUTPATIENT (OUTPATIENT)
Dept: OUTPATIENT SERVICES | Facility: HOSPITAL | Age: 60
LOS: 1 days | End: 2025-01-24

## 2025-01-24 ENCOUNTER — APPOINTMENT (OUTPATIENT)
Dept: INTERNAL MEDICINE | Facility: CLINIC | Age: 60
End: 2025-01-24

## 2025-01-24 VITALS
RESPIRATION RATE: 16 BRPM | SYSTOLIC BLOOD PRESSURE: 120 MMHG | DIASTOLIC BLOOD PRESSURE: 68 MMHG | BODY MASS INDEX: 36.58 KG/M2 | HEIGHT: 73 IN | TEMPERATURE: 97 F | HEART RATE: 67 BPM | OXYGEN SATURATION: 97 % | WEIGHT: 276 LBS

## 2025-01-24 VITALS
HEART RATE: 69 BPM | HEIGHT: 74 IN | SYSTOLIC BLOOD PRESSURE: 107 MMHG | DIASTOLIC BLOOD PRESSURE: 76 MMHG | OXYGEN SATURATION: 97 % | RESPIRATION RATE: 17 BRPM | WEIGHT: 276.02 LBS | TEMPERATURE: 98 F

## 2025-01-24 DIAGNOSIS — I27.82 CHRONIC PULMONARY EMBOLISM: ICD-10-CM

## 2025-01-24 DIAGNOSIS — I63.9 CEREBRAL INFARCTION, UNSPECIFIED: ICD-10-CM

## 2025-01-24 DIAGNOSIS — K11.8 OTHER DISEASES OF SALIVARY GLANDS: ICD-10-CM

## 2025-01-24 DIAGNOSIS — Z87.74 PERSONAL HISTORY OF (CORRECTED) CONGENITAL MALFORMATIONS OF HEART AND CIRCULATORY SYSTEM: Chronic | ICD-10-CM

## 2025-01-24 DIAGNOSIS — Z78.9 OTHER SPECIFIED HEALTH STATUS: Chronic | ICD-10-CM

## 2025-01-24 DIAGNOSIS — E66.01 MORBID (SEVERE) OBESITY DUE TO EXCESS CALORIES: ICD-10-CM

## 2025-01-24 DIAGNOSIS — E66.3 OVERWEIGHT: ICD-10-CM

## 2025-01-24 PROCEDURE — 99213 OFFICE O/P EST LOW 20 MIN: CPT

## 2025-01-24 RX ORDER — SODIUM CHLORIDE 9 G/ML
1000 INJECTION, SOLUTION INTRAVENOUS
Refills: 0 | Status: DISCONTINUED | OUTPATIENT
Start: 2025-02-03 | End: 2025-02-03

## 2025-01-24 NOTE — H&P PST ADULT - NSICDXPASTSURGICALHX_GEN_ALL_CORE_FT
PAST SURGICAL HISTORY:  History of percutaneous transcatheter closure of congenital ASD     History of surgical closure of patent foramen ovale (PFO)

## 2025-01-24 NOTE — H&P PST ADULT - EKG AND INTERPRETATION
2025 with cardio - [employer] 2025 with cardio - [employer] Sinus Rhythm  -Incomplete right bundle branch block.  ?

## 2025-01-24 NOTE — H&P PST ADULT - HISTORY OF PRESENT ILLNESS
59 yr old male with complex medical hx notable for HTN, PFO, s/p PFO closure (08/2024), s/pCVA (06/2024), mild coronary artery disease, hyperlipidemia, arrhythmia (nonsustained VT, paroxysmal atrial tachycardia, LOOP recorder in situ), status post ILR, Pulmonary embolism June 2024 s/p anticoag until January 2025 presents for preop evaluation with incidental finding on MRI after CVA of right Parotid mass.  Patient subsequently had biopsy and is now scheduled for Right Parotidectomy on 02/03/25. 59 yr old male with complex medical hx notable for PFO, s/p PFO closure (08/2024), s/pCVA (06/2024), mild coronary artery disease, hyperlipidemia, arrhythmia (nonsustained VT, paroxysmal atrial tachycardia, LOOP recorder in situ), status post ILR, Pulmonary embolism June 2024 s/p anticoag until January 2025 presents for preop evaluation with incidental finding on MRI after CVA of right Parotid mass.  Patient subsequently had biopsy and is now scheduled for Right Parotidectomy on 02/03/25.

## 2025-01-24 NOTE — H&P PST ADULT - PROBLEM SELECTOR PLAN 1
Scheduled for Right Parotidectomy on 02/03/25  Preop instructions provided and patient verbalizes understanding.  Labs done and results pending.   Famotidine provided with instructions. Patient verbalized understanding.

## 2025-01-24 NOTE — H&P PST ADULT - NSICDXPASTMEDICALHX_GEN_ALL_CORE_FT
PAST MEDICAL HISTORY:  HLD (hyperlipidemia)     MI (myocardial infarction)     PFO (patent foramen ovale)     Stroke     TIA (transient ischemic attack)      PAST MEDICAL HISTORY:  COVID     HLD (hyperlipidemia)     MI (myocardial infarction)     Other diseases of salivary glands     PFO (patent foramen ovale)     Pulmonary embolism     Stroke     TIA (transient ischemic attack)

## 2025-01-24 NOTE — H&P PST ADULT - CARDIOVASCULAR COMMENTS
hx of hx of  PFO, s/p PFO closure (08/2024), mild coronary artery disease, hyperlipidemia, arrhythmia (nonsustained VT, paroxysmal atrial tachycardia, LOOP recorder in situ),

## 2025-01-24 NOTE — H&P PST ADULT - LAST ECHOCARDIOGRAM
12/24 - [facility]sck[facility] 12/24 - allscripts - Left ventricular systolic function is normal with an ejection fraction visually estimated at 55 to 60 %. There are no regional wall motion abnormalities seen.

## 2025-02-01 NOTE — ASU PATIENT PROFILE, ADULT - NSICDXPASTMEDICALHX_GEN_ALL_CORE_FT
PAST MEDICAL HISTORY:  COVID     HLD (hyperlipidemia)     MI (myocardial infarction)     Other diseases of salivary glands     PFO (patent foramen ovale)     Pulmonary embolism     Stroke     TIA (transient ischemic attack)

## 2025-02-01 NOTE — ASU PATIENT PROFILE, ADULT - PATIENT REPRESENTATIVE NAME
Patient requested to speak with Crisis Worker- Patient expressed frustration at seeing other patients come and go while she is still in the Emergency Department- Crisis Worker explained that we are at the mercy of the psychiatric facilities, and most do not even review patients until ten days after their first positive test, regardless of whether they are asymptomatic, and that does not ensure they will have a bed or accept the patient- Crisis Worker reminded patient that a psych consult has been placed and she will meet with a psychiatrist via tele-health- Patient upset but able to acknowledge understanding of the current plan      Anthony Chatterjee  Crisis Worker, [place]  02/16/22 Ayla

## 2025-02-03 ENCOUNTER — TRANSCRIPTION ENCOUNTER (OUTPATIENT)
Age: 60
End: 2025-02-03

## 2025-02-03 ENCOUNTER — APPOINTMENT (OUTPATIENT)
Dept: OTOLARYNGOLOGY | Facility: HOSPITAL | Age: 60
End: 2025-02-03

## 2025-02-03 ENCOUNTER — RESULT REVIEW (OUTPATIENT)
Age: 60
End: 2025-02-03

## 2025-02-03 ENCOUNTER — OUTPATIENT (OUTPATIENT)
Dept: INPATIENT UNIT | Facility: HOSPITAL | Age: 60
LOS: 1 days | Discharge: HOME | End: 2025-02-03
Payer: COMMERCIAL

## 2025-02-03 VITALS
HEART RATE: 79 BPM | OXYGEN SATURATION: 96 % | DIASTOLIC BLOOD PRESSURE: 69 MMHG | TEMPERATURE: 98 F | SYSTOLIC BLOOD PRESSURE: 105 MMHG | RESPIRATION RATE: 16 BRPM

## 2025-02-03 VITALS
TEMPERATURE: 98 F | WEIGHT: 276.02 LBS | OXYGEN SATURATION: 98 % | RESPIRATION RATE: 16 BRPM | SYSTOLIC BLOOD PRESSURE: 114 MMHG | DIASTOLIC BLOOD PRESSURE: 78 MMHG | HEIGHT: 74 IN | HEART RATE: 67 BPM

## 2025-02-03 DIAGNOSIS — Z87.74 PERSONAL HISTORY OF (CORRECTED) CONGENITAL MALFORMATIONS OF HEART AND CIRCULATORY SYSTEM: Chronic | ICD-10-CM

## 2025-02-03 DIAGNOSIS — K11.8 OTHER DISEASES OF SALIVARY GLANDS: ICD-10-CM

## 2025-02-03 PROCEDURE — 88307 TISSUE EXAM BY PATHOLOGIST: CPT | Mod: 26

## 2025-02-03 PROCEDURE — 88305 TISSUE EXAM BY PATHOLOGIST: CPT | Mod: 26

## 2025-02-03 PROCEDURE — 88331 PATH CONSLTJ SURG 1 BLK 1SPC: CPT | Mod: 26

## 2025-02-03 PROCEDURE — 42415 EXCISE PAROTID GLAND/LESION: CPT | Mod: GC,RT

## 2025-02-03 DEVICE — LIGATING CLIPS WECK HORIZON MEDIUM (BLUE) 24: Type: IMPLANTABLE DEVICE | Site: RIGHT | Status: FUNCTIONAL

## 2025-02-03 DEVICE — LIGATING CLIPS WECK HORIZON SMALL-WIDE (RED) 24: Type: IMPLANTABLE DEVICE | Site: RIGHT | Status: FUNCTIONAL

## 2025-02-03 DEVICE — CARTRIDGE MICROCLIP 30: Type: IMPLANTABLE DEVICE | Site: RIGHT | Status: FUNCTIONAL

## 2025-02-03 DEVICE — SURGICEL 2 X 14": Type: IMPLANTABLE DEVICE | Site: RIGHT | Status: FUNCTIONAL

## 2025-02-03 RX ORDER — OXYCODONE HYDROCHLORIDE AND ACETAMINOPHEN 5; 325 MG/1; MG/1
1 TABLET ORAL
Qty: 8 | Refills: 0
Start: 2025-02-03 | End: 2025-02-04

## 2025-02-03 RX ORDER — PHENOL 1.4 %
1 AEROSOL, SPRAY (ML) MUCOUS MEMBRANE ONCE
Refills: 0 | Status: COMPLETED | OUTPATIENT
Start: 2025-02-03 | End: 2025-02-03

## 2025-02-03 RX ORDER — SODIUM CHLORIDE 9 G/ML
1000 INJECTION, SOLUTION INTRAVENOUS
Refills: 0 | Status: DISCONTINUED | OUTPATIENT
Start: 2025-02-03 | End: 2025-02-17

## 2025-02-03 RX ORDER — FENTANYL CITRATE 50 UG/ML
25 INJECTION INTRAMUSCULAR; INTRAVENOUS
Refills: 0 | Status: DISCONTINUED | OUTPATIENT
Start: 2025-02-03 | End: 2025-02-03

## 2025-02-03 RX ORDER — ONDANSETRON 4 MG/1
4 TABLET, ORALLY DISINTEGRATING ORAL ONCE
Refills: 0 | Status: DISCONTINUED | OUTPATIENT
Start: 2025-02-03 | End: 2025-02-17

## 2025-02-03 RX ORDER — ACETAMINOPHEN 160 MG/5ML
650 SUSPENSION ORAL EVERY 6 HOURS
Refills: 0 | Status: DISCONTINUED | OUTPATIENT
Start: 2025-02-03 | End: 2025-02-17

## 2025-02-03 RX ORDER — ONDANSETRON 4 MG/1
4 TABLET, ORALLY DISINTEGRATING ORAL EVERY 6 HOURS
Refills: 0 | Status: DISCONTINUED | OUTPATIENT
Start: 2025-02-03 | End: 2025-02-17

## 2025-02-03 RX ORDER — OXYCODONE HYDROCHLORIDE 30 MG/1
5 TABLET ORAL EVERY 6 HOURS
Refills: 0 | Status: DISCONTINUED | OUTPATIENT
Start: 2025-02-03 | End: 2025-02-03

## 2025-02-03 RX ADMIN — Medication 1 LOZENGE: at 15:51

## 2025-02-03 NOTE — ASU DISCHARGE PLAN (ADULT/PEDIATRIC) - NURSING INSTRUCTIONS
You received IV Tylenol for pain management at 12:10pm Please DO NOT take any Tylenol (Acetaminophen) containing products, such as Vicodin, Percocet, Excedrin, and cold medications for the next 6 hours (until 6:10 PM). DO NOT TAKE MORE THAN 3000 MG OF TYLENOL in a 24 hour period.

## 2025-02-03 NOTE — ASU DISCHARGE PLAN (ADULT/PEDIATRIC) - NS MD DC FALL RISK RISK
For information on Fall & Injury Prevention, visit: [email or web address] OR  [email or web address] OR  https://www.cdc.gov/steadi/patient.html

## 2025-02-03 NOTE — ASU DISCHARGE PLAN (ADULT/PEDIATRIC) - CARE PROVIDER_API CALL
Petr Jack  Head/Neck Surgery  [address] [place] [address]  Phone: [phone]  Fax: [phone]  Established Patient  Follow Up Time:

## 2025-02-03 NOTE — ASU DISCHARGE PLAN (ADULT/PEDIATRIC) - FOLLOW UP APPOINTMENTS
[facility] May also call Recovery Room (PACU) 24/7 @ [phone]/[facility], [facility]

## 2025-02-03 NOTE — ASU DISCHARGE PLAN (ADULT/PEDIATRIC) - ASU DC SPECIAL INSTRUCTIONSFT
You may restart your ASA on Wednesday. You may restart your ASA on Wednesday. You will be going home with your drain. Please call Dr. Jack's office, they will notify you when to come in for removal.

## 2025-02-03 NOTE — ASU DISCHARGE PLAN (ADULT/PEDIATRIC) - FINANCIAL ASSISTANCE
[facility] provides services at a reduced cost to those who are determined to be eligible through [facility]’s financial assistance program. Information regarding [facility]’s financial assistance program can be found by going to [email or web address] or by calling [phone].

## 2025-02-04 PROBLEM — K11.8 OTHER DISEASES OF SALIVARY GLANDS: Chronic | Status: ACTIVE | Noted: 2025-01-24

## 2025-02-04 PROBLEM — I63.9 CEREBRAL INFARCTION, UNSPECIFIED: Chronic | Status: ACTIVE | Noted: 2025-01-24

## 2025-02-05 ENCOUNTER — APPOINTMENT (OUTPATIENT)
Dept: OTOLARYNGOLOGY | Facility: CLINIC | Age: 60
End: 2025-02-05
Payer: COMMERCIAL

## 2025-02-05 PROCEDURE — 99024 POSTOP FOLLOW-UP VISIT: CPT

## 2025-02-07 LAB — SURGICAL PATHOLOGY STUDY: SIGNIFICANT CHANGE UP

## 2025-02-10 ENCOUNTER — APPOINTMENT (OUTPATIENT)
Dept: ELECTROPHYSIOLOGY | Facility: CLINIC | Age: 60
End: 2025-02-10

## 2025-02-10 PROCEDURE — 93298 REM INTERROG DEV EVAL SCRMS: CPT

## 2025-02-11 PROBLEM — I26.99 OTHER PULMONARY EMBOLISM WITHOUT ACUTE COR PULMONALE: Chronic | Status: ACTIVE | Noted: 2025-01-24

## 2025-02-11 PROBLEM — U07.1 COVID-19: Chronic | Status: ACTIVE | Noted: 2025-01-24

## 2025-02-12 ENCOUNTER — APPOINTMENT (OUTPATIENT)
Dept: OTOLARYNGOLOGY | Facility: CLINIC | Age: 60
End: 2025-02-12
Payer: COMMERCIAL

## 2025-02-12 DIAGNOSIS — K11.8 OTHER DISEASES OF SALIVARY GLANDS: ICD-10-CM

## 2025-02-12 PROCEDURE — 99024 POSTOP FOLLOW-UP VISIT: CPT

## 2025-03-05 ENCOUNTER — APPOINTMENT (OUTPATIENT)
Dept: NEUROLOGY | Facility: CLINIC | Age: 60
End: 2025-03-05
Payer: COMMERCIAL

## 2025-03-05 VITALS
HEART RATE: 70 BPM | BODY MASS INDEX: 36.58 KG/M2 | SYSTOLIC BLOOD PRESSURE: 108 MMHG | OXYGEN SATURATION: 96 % | TEMPERATURE: 98 F | HEIGHT: 73 IN | WEIGHT: 276 LBS | DIASTOLIC BLOOD PRESSURE: 74 MMHG

## 2025-03-05 VITALS
HEART RATE: 70 BPM | BODY MASS INDEX: 36.58 KG/M2 | SYSTOLIC BLOOD PRESSURE: 108 MMHG | TEMPERATURE: 98.1 F | DIASTOLIC BLOOD PRESSURE: 74 MMHG | OXYGEN SATURATION: 96 % | HEIGHT: 73 IN | WEIGHT: 276 LBS

## 2025-03-05 DIAGNOSIS — Q21.12 PATENT FORAMEN OVALE: ICD-10-CM

## 2025-03-05 DIAGNOSIS — I63.9 CEREBRAL INFARCTION, UNSPECIFIED: ICD-10-CM

## 2025-03-05 PROCEDURE — G2211 COMPLEX E/M VISIT ADD ON: CPT | Mod: NC

## 2025-03-05 PROCEDURE — 99214 OFFICE O/P EST MOD 30 MIN: CPT

## 2025-03-14 ENCOUNTER — APPOINTMENT (OUTPATIENT)
Dept: OTOLARYNGOLOGY | Facility: CLINIC | Age: 60
End: 2025-03-14
Payer: COMMERCIAL

## 2025-03-14 DIAGNOSIS — K11.8 OTHER DISEASES OF SALIVARY GLANDS: ICD-10-CM

## 2025-03-14 PROCEDURE — 99024 POSTOP FOLLOW-UP VISIT: CPT

## 2025-03-17 ENCOUNTER — NON-APPOINTMENT (OUTPATIENT)
Age: 60
End: 2025-03-17

## 2025-03-17 ENCOUNTER — APPOINTMENT (OUTPATIENT)
Dept: ELECTROPHYSIOLOGY | Facility: CLINIC | Age: 60
End: 2025-03-17
Payer: COMMERCIAL

## 2025-03-17 PROCEDURE — 93298 REM INTERROG DEV EVAL SCRMS: CPT

## 2025-03-26 ENCOUNTER — APPOINTMENT (OUTPATIENT)
Dept: OTOLARYNGOLOGY | Facility: CLINIC | Age: 60
End: 2025-03-26

## 2025-03-26 DIAGNOSIS — K11.8 OTHER DISEASES OF SALIVARY GLANDS: ICD-10-CM

## 2025-03-26 PROCEDURE — 99024 POSTOP FOLLOW-UP VISIT: CPT

## 2025-04-21 ENCOUNTER — NON-APPOINTMENT (OUTPATIENT)
Age: 60
End: 2025-04-21

## 2025-04-21 ENCOUNTER — APPOINTMENT (OUTPATIENT)
Dept: ELECTROPHYSIOLOGY | Facility: CLINIC | Age: 60
End: 2025-04-21
Payer: COMMERCIAL

## 2025-04-21 PROCEDURE — 93298 REM INTERROG DEV EVAL SCRMS: CPT

## 2025-04-22 ENCOUNTER — NON-APPOINTMENT (OUTPATIENT)
Age: 60
End: 2025-04-22

## 2025-04-22 ENCOUNTER — APPOINTMENT (OUTPATIENT)
Dept: CARDIOLOGY | Facility: CLINIC | Age: 60
End: 2025-04-22
Payer: COMMERCIAL

## 2025-04-22 VITALS
HEART RATE: 72 BPM | HEIGHT: 73 IN | DIASTOLIC BLOOD PRESSURE: 73 MMHG | WEIGHT: 260 LBS | SYSTOLIC BLOOD PRESSURE: 110 MMHG | BODY MASS INDEX: 34.46 KG/M2 | RESPIRATION RATE: 20 BRPM | OXYGEN SATURATION: 95 %

## 2025-04-22 DIAGNOSIS — E78.5 HYPERLIPIDEMIA, UNSPECIFIED: ICD-10-CM

## 2025-04-22 DIAGNOSIS — Z95.818 PRESENCE OF OTHER CARDIAC IMPLANTS AND GRAFTS: ICD-10-CM

## 2025-04-22 DIAGNOSIS — Q21.12 PATENT FORAMEN OVALE: ICD-10-CM

## 2025-04-22 DIAGNOSIS — I77.810 THORACIC AORTIC ECTASIA: ICD-10-CM

## 2025-04-22 DIAGNOSIS — I47.29 OTHER VENTRICULAR TACHYCARDIA: ICD-10-CM

## 2025-04-22 DIAGNOSIS — I25.10 ATHEROSCLEROTIC HEART DISEASE OF NATIVE CORONARY ARTERY W/OUT ANGINA PECTORIS: ICD-10-CM

## 2025-04-22 DIAGNOSIS — I63.9 CEREBRAL INFARCTION, UNSPECIFIED: ICD-10-CM

## 2025-04-22 DIAGNOSIS — I10 ESSENTIAL (PRIMARY) HYPERTENSION: ICD-10-CM

## 2025-04-22 DIAGNOSIS — I47.19 OTHER SUPRAVENTRICULAR TACHYCARDIA: ICD-10-CM

## 2025-04-22 PROCEDURE — 93000 ELECTROCARDIOGRAM COMPLETE: CPT

## 2025-04-22 PROCEDURE — 99215 OFFICE O/P EST HI 40 MIN: CPT

## 2025-04-22 PROCEDURE — G2211 COMPLEX E/M VISIT ADD ON: CPT | Mod: NC

## 2025-05-22 ENCOUNTER — APPOINTMENT (OUTPATIENT)
Dept: ELECTROPHYSIOLOGY | Facility: CLINIC | Age: 60
End: 2025-05-22

## 2025-05-22 PROCEDURE — 93298 REM INTERROG DEV EVAL SCRMS: CPT

## 2025-06-26 ENCOUNTER — APPOINTMENT (OUTPATIENT)
Dept: ELECTROPHYSIOLOGY | Facility: CLINIC | Age: 60
End: 2025-06-26
Payer: COMMERCIAL

## 2025-06-26 ENCOUNTER — NON-APPOINTMENT (OUTPATIENT)
Age: 60
End: 2025-06-26

## 2025-06-26 PROCEDURE — 93298 REM INTERROG DEV EVAL SCRMS: CPT

## 2025-07-11 ENCOUNTER — APPOINTMENT (OUTPATIENT)
Dept: INTERNAL MEDICINE | Facility: CLINIC | Age: 60
End: 2025-07-11

## 2025-07-11 VITALS
OXYGEN SATURATION: 98 % | BODY MASS INDEX: 34.19 KG/M2 | RESPIRATION RATE: 18 BRPM | SYSTOLIC BLOOD PRESSURE: 126 MMHG | WEIGHT: 258 LBS | HEART RATE: 68 BPM | TEMPERATURE: 97.9 F | HEIGHT: 73 IN | DIASTOLIC BLOOD PRESSURE: 82 MMHG

## 2025-07-11 PROBLEM — N52.9 ERECTILE DYSFUNCTION: Status: ACTIVE | Noted: 2025-07-11

## 2025-07-11 PROBLEM — R79.89 ELEVATED SERUM CREATININE: Status: ACTIVE | Noted: 2025-07-11

## 2025-07-11 PROCEDURE — 99396 PREV VISIT EST AGE 40-64: CPT

## 2025-07-11 RX ORDER — SILDENAFIL 25 MG/1
25 TABLET ORAL
Qty: 6 | Refills: 5 | Status: ACTIVE | COMMUNITY
Start: 2025-07-11 | End: 1900-01-01

## 2025-07-31 ENCOUNTER — NON-APPOINTMENT (OUTPATIENT)
Age: 60
End: 2025-07-31

## 2025-07-31 ENCOUNTER — APPOINTMENT (OUTPATIENT)
Dept: ELECTROPHYSIOLOGY | Facility: CLINIC | Age: 60
End: 2025-07-31
Payer: COMMERCIAL

## 2025-07-31 PROCEDURE — 93298 REM INTERROG DEV EVAL SCRMS: CPT

## 2025-09-04 ENCOUNTER — APPOINTMENT (OUTPATIENT)
Dept: ELECTROPHYSIOLOGY | Facility: CLINIC | Age: 60
End: 2025-09-04

## 2025-09-04 PROCEDURE — 93298 REM INTERROG DEV EVAL SCRMS: CPT

## (undated) DEVICE — PLATE NESSY 170

## (undated) DEVICE — PREP BETADINE KIT

## (undated) DEVICE — DRAPE TOWEL BLUE 17" X 24"

## (undated) DEVICE — SUT VICRYL 4-0 27" RB-1 UNDYED

## (undated) DEVICE — DRAPE WARMING SOLUTION 44 X 44"

## (undated) DEVICE — SUT SILK 3-0 18" TIES

## (undated) DEVICE — DRSG TELFA 4 X 8

## (undated) DEVICE — SNARE EXACTO COLD 9MMX230CM

## (undated) DEVICE — RUBBER BANDS STERILE

## (undated) DEVICE — ENDOCUFF VISION SZ 2 LG GRN

## (undated) DEVICE — CONTAINER FORMALIN BUFF 10% 60ML

## (undated) DEVICE — SAFETY PIN

## (undated) DEVICE — NERVE STIMULATOR/LOCATOR HEAD AND NECK MODEL

## (undated) DEVICE — PACK HEAD & NECK

## (undated) DEVICE — DRAPE SPLIT SHEET 77" X 120"

## (undated) DEVICE — CANISTER DISPOSABLE THIN WALL 3000CC

## (undated) DEVICE — SNARE POLYP SENS SM 13MM 240CM

## (undated) DEVICE — DRAPE 3/4 SHEET 52X76"

## (undated) DEVICE — DRSG STERISTRIPS 0.25 X 3"

## (undated) DEVICE — FORCEP RADIAL JAW 4 240CM DISP

## (undated) DEVICE — POSITIONER FOAM EGG CRATE ULNAR 2PCS (PINK)

## (undated) DEVICE — POLY TRAP ETRAP

## (undated) DEVICE — ELCTR BOVIE PENCIL SMOKE EVACUATION

## (undated) DEVICE — SYR LUER LOK 5CC

## (undated) DEVICE — TUBING CAP SET ERBEFLO CLEVERCAP HYBRID CO2 FOR OLYMPUS SCOPES AND UCR

## (undated) DEVICE — GLV 7 PROTEXIS (WHITE)

## (undated) DEVICE — SUT SILK 2-0 18" FS

## (undated) DEVICE — DRAIN JACKSON PRATT 7FR ROUND END NO TROCAR

## (undated) DEVICE — SOL IRR POUR NS 0.9% 1500ML

## (undated) DEVICE — WARMING BLANKET FULL ADULT

## (undated) DEVICE — ELCTR GROUNDING PAD ADULT COVIDIEN

## (undated) DEVICE — LABELS BLANK W PEN

## (undated) DEVICE — NERVE LOCATOR  III

## (undated) DEVICE — SOL IRR POUR H2O 1000ML

## (undated) DEVICE — VENODYNE/SCD SLEEVE CALF MEDIUM

## (undated) DEVICE — DRAPE MAGNETIC INSTRUMENT MEDIUM

## (undated) DEVICE — SUT SILK 2-0 24" TIES

## (undated) DEVICE — DRAPE SURGICAL #1010

## (undated) DEVICE — STAPLER SKIN VISI-STAT 35 WIDE

## (undated) DEVICE — KIT ENDO PROCEDURE CUST W/VLV

## (undated) DEVICE — DRAIN RESERVOIR FOR JACKSON PRATT 100CC CARDINAL

## (undated) DEVICE — DRSG BIOPATCH DISK W CHG 1" W 4.0MM HOLE

## (undated) DEVICE — DRAPE INSTRUMENT POUCH 6.75" X 11"

## (undated) DEVICE — NDL HYPO SAFE 22G X 1" (BLACK)

## (undated) DEVICE — SUT ETHILON 6-0 18" PS-3

## (undated) DEVICE — DRSG BENZOIN 0.6CC